# Patient Record
Sex: MALE | Race: WHITE | URBAN - METROPOLITAN AREA
[De-identification: names, ages, dates, MRNs, and addresses within clinical notes are randomized per-mention and may not be internally consistent; named-entity substitution may affect disease eponyms.]

---

## 2020-08-03 ENCOUNTER — INITIAL CONSULT (OUTPATIENT)
Dept: PODIATRY | Facility: CLINIC | Age: 25
End: 2020-08-03

## 2020-08-03 VITALS — WEIGHT: 300 LBS | BODY MASS INDEX: 39.76 KG/M2 | HEIGHT: 73 IN | TEMPERATURE: 98.2 F

## 2020-08-03 RX ORDER — IBUPROFEN 800 MG/1
800 TABLET ORAL 2 TIMES DAILY PRN
Qty: 60 TABLET | Refills: 0 | Status: SHIPPED | OUTPATIENT
Start: 2020-08-03 | End: 2022-01-12

## 2020-08-03 ASSESSMENT — ENCOUNTER SYMPTOMS
NAUSEA: 0
CONSTIPATION: 0
BACK PAIN: 1
SHORTNESS OF BREATH: 0
DIARRHEA: 0

## 2020-08-03 NOTE — PROGRESS NOTES
Herber Wynn  (1995)    8/3/20    Subjective     Herber Wynn is 22 y.o. male who complains today of:    Chief Complaint   Patient presents with    Other       HPI: Herber Wynn is seen in consultation at the request of Dr. Ct Mares for evaluation of ingrown toenail. Review of Systems   Constitutional: Negative for fever. HENT: Negative for hearing loss. Respiratory: Negative for shortness of breath. Gastrointestinal: Negative for constipation, diarrhea and nausea. Genitourinary: Negative for difficulty urinating. Musculoskeletal: Positive for back pain. Negative for neck pain. Skin: Negative for rash. Neurological: Negative for headaches. Hematological: Does not bruise/bleed easily. Psychiatric/Behavioral: Positive for sleep disturbance. He has not tried physical therapy. Date of last of last PT treatment: none    The patient is NOT a diabetic. Allergies:  Patient has no known allergies. No current outpatient medications on file prior to visit. No current facility-administered medications on file prior to visit. History reviewed. No pertinent past medical history. History reviewed. No pertinent surgical history. Social History     Socioeconomic History    Marital status: Single     Spouse name: Not on file    Number of children: Not on file    Years of education: Not on file    Highest education level: Not on file   Occupational History    Not on file   Social Needs    Financial resource strain: Not on file    Food insecurity     Worry: Not on file     Inability: Not on file    Transportation needs     Medical: Not on file     Non-medical: Not on file   Tobacco Use    Smoking status: Former Smoker     Last attempt to quit: 2019     Years since quittin.3    Smokeless tobacco: Never Used   Substance and Sexual Activity    Alcohol use:  Yes     Alcohol/week: 6.0 standard drinks     Types: 6 Cans of beer per week    Drug use: Not Currently    Sexual activity: Not on file   Lifestyle    Physical activity     Days per week: Not on file     Minutes per session: Not on file    Stress: Not on file   Relationships    Social connections     Talks on phone: Not on file     Gets together: Not on file     Attends Jehovah's witness service: Not on file     Active member of club or organization: Not on file     Attends meetings of clubs or organizations: Not on file     Relationship status: Not on file    Intimate partner violence     Fear of current or ex partner: Not on file     Emotionally abused: Not on file     Physically abused: Not on file     Forced sexual activity: Not on file   Other Topics Concern    Not on file   Social History Narrative    Not on file     Family History   Problem Relation Age of Onset    Cancer Father     Hypertension Father            Objective:   Vitals:  Temp 98.2 °F (36.8 °C) (Temporal)   Ht 6' 1\" (1.854 m)   Wt 300 lb (136.1 kg)   BMI 39.58 kg/m² Pain Score:   6    Physical Exam  Constitutional:     Well nourished and well developed, obese. Appears neat and clean. Patient is alert, oriented x3, and in no apparent distress. Vascular:   Dorsalis pedis pulse is palpable bilateral foot. Posterior tibial pulse is palpable bilateral foot. There is focal edema noted to the bilateral big toes. Capillary refill is immediate bilateral hallux. There are no varicosities noted bilaterally. There are no telangiectasia noted bilaterally. Skin temperature gradient is noted to be warm to warm bilaterally. There are no signs of ischemia or skin atrophy noted bilaterally. Hair growth is present bilaterally. Neurological:   Light touch sensation is intact bilaterally. Vibratory sensation is intact bilaterally. Hot versus cold discrimination is intact bilaterally. Sharp versus dull discrimination is intact bilaterally. Patellar deep tendon reflex is graded at 2/4 bilaterally.   Achilles tendon deep tendon reflex is graded at 2/4 bilaterally. The Babinski response is negative bilaterally. Dermatological:  The bilateral nail border of the right hallux and the lateral nail border of the left hallux are erythematous with mild granuloma formation, tirso purulence expressed with compression of the toe, +TTP. The remaining toenails are incurvated and are well groomed bilaterally. Normal skin texture noted bilaterally. Focal hyperkeratotic lesions noted: none. Normal skin turgor noted bilaterally. Webspace 1-4 is dry and intact bilateral foot. Musculoskeletal:  Rectus foot type noted bilaterally. Manual muscle strength is graded at 5/5 for all groups tested bilateral foot. Gross static deformities noted: mild HAV and tailors bunion deformity noted bilaterally. Pain or crepitus noted with active or passive range of motion of the joints of the foot or ankle: none. Normal ankle joint dorsiflexion noted bilateral lower extremity. Psychiatric:    Judgement and insight intact. Short and long term memory intact. No evidence of depression, anxiety, or agitation. Patient is calm, cooperative, and communicative. Appropriate interactions and affect. Assessment:      Diagnosis Orders   1. Pain in toes of both feet  ME DRAIN SKIN ABSCESS COMPLIC   2. Abscess of great toenail of right foot  ME DRAIN SKIN ABSCESS COMPLIC   3. Abscess of great toenail, left  ME DRAIN SKIN ABSCESS COMPLIC   4. Ingrowing nail         Plan:     Bilateral hallux ingrown nail, abscess:    I have had a lengthy discussion with the patient and discussed the various treatment options available. I have recommended an incision and drainage procedure. After obtaining appropriate informed consent and verifying the correct digits, the toes were anesthetized with 4 mL of 1.0% Lidocaine plain each after which the digit was prepped and draped in the usual aseptic manner.      The offending nail borders were removed and the exposed nail bed and nail groove were irrigated with alcohol. An antibiotic impregnated compression dressing was applied to the toes. Written postoperative instructions were dispensed. Orders Placed This Encounter   Medications    ibuprofen (ADVIL;MOTRIN) 800 MG tablet     Sig: Take 1 tablet by mouth 2 times daily as needed for Pain     Dispense:  60 tablet     Refill:  0       Orders Placed This Encounter   Procedures    GA DRAIN SKIN ABSCESS [facility]         Follow up:  Return in about 2 weeks (around 8/17/2020). Shanta Nathan DPM      Please note that this report has been partially produced using speech recognition software which may cause errors including grammar, punctuation, and spelling or words and phrases that may seem inappropriate. If there are questions or concerns please feel free to contact me for clarification.

## 2020-08-17 ENCOUNTER — OFFICE VISIT (OUTPATIENT)
Dept: PODIATRY | Facility: CLINIC | Age: 25
End: 2020-08-17

## 2020-08-17 ENCOUNTER — TELEPHONE (OUTPATIENT)
Dept: PODIATRY | Facility: CLINIC | Age: 25
End: 2020-08-17

## 2020-08-17 VITALS — HEIGHT: 73 IN | TEMPERATURE: 97 F | WEIGHT: 307 LBS | BODY MASS INDEX: 40.69 KG/M2

## 2020-08-17 ASSESSMENT — ENCOUNTER SYMPTOMS
CONSTIPATION: 0
NAUSEA: 0
BACK PAIN: 1
SHORTNESS OF BREATH: 0
DIARRHEA: 0

## 2020-08-17 NOTE — TELEPHONE ENCOUNTER
PATIENT LVM STATING HE FORGOT TO ASK DR CUI IF THERE WAS AN INSERT/CUSHION HE COULD PRESCRIBE FOR HIM TO PUT IN HIS SHOES. 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 PATIENT WANTS TO KNOW IF IT WOULD BE POSSIBLE TO SEND THE ORDER TO THE PHARMACY SO HE COULD PICK IT UP ASAP.  CALL BACK NUMBER  [phone]

## 2020-08-17 NOTE — TELEPHONE ENCOUNTER
I recommend Powerstep full length orthotic available on amaz[employer]. com, the inserts at the pharmacy are not covered by insurance.

## 2020-08-17 NOTE — PROGRESS NOTES
Non-medical: Not on file   Tobacco Use    Smoking status: Former Smoker     Last attempt to quit: 2019     Years since quittin.3    Smokeless tobacco: Never Used   Substance and Sexual Activity    Alcohol use: Yes     Alcohol/week: 6.0 standard drinks     Types: 6 Cans of beer per week    Drug use: Not Currently    Sexual activity: Not on file   Lifestyle    Physical activity     Days per week: Not on file     Minutes per session: Not on file    Stress: Not on file   Relationships    Social connections     Talks on phone: Not on file     Gets together: Not on file     Attends Anabaptist service: Not on file     Active member of club or organization: Not on file     Attends meetings of clubs or organizations: Not on file     Relationship status: Not on file    Intimate partner violence     Fear of current or ex partner: Not on file     Emotionally abused: Not on file     Physically abused: Not on file     Forced sexual activity: Not on file   Other Topics Concern    Not on file   Social History Narrative    Not on file     Family History   Problem Relation Age of Onset    Cancer Father     Hypertension Father            Objective:   Vitals:  Temp 97 °F (36.1 °C)   Ht 6' 1\" (1.854 m)   Wt (!) 307 lb (139.3 kg)   BMI 40.50 kg/m² Pain Score:   0 - No pain    Physical Exam  Vascular:   Dorsalis pedis pulse is palpable bilateral foot. Posterior tibial pulse is palpable bilateral foot. There is no focal edema noted to the bilateral hallux. Capillary refill is immediate bilateral hallux. There are no varicosities noted bilaterally. There are no telangiectasia noted bilaterally. Skin temperature gradient is noted to be warm to warm bilaterally, no increased warmth noted to the bilateral hallux. There are no signs of ischemia or skin atrophy noted bilaterally. Hair growth is present bilaterally.     Neurological:   Light touch sensation is intact bilaterally.   Vibratory sensation is intact bilaterally. Hot versus cold discrimination is intact bilaterally. Sharp versus dull discrimination is intact bilaterally. Patellar deep tendon reflex is graded at 2/4 bilaterally. Achilles tendon deep tendon reflex is graded at 2/4 bilaterally. The Babinski response is negative bilaterally.     Dermatological:  The bilateral nail border of the right hallux and the lateral nail border of the left hallux are dry and intact, there is no erythema, edema,   or tirso purulence expressed with compression of the toe, no tenderness to palpation noted. Normal skin texture noted bilaterally. Focal hyperkeratotic lesions noted: none. Normal skin turgor noted bilaterally. Webspace 1-4 is dry and intact bilateral foot.     Musculoskeletal:  Rectus foot type noted bilaterally. Manual muscle strength is graded at 5/5 for all groups tested bilateral foot. Gross static deformities noted: mild HAV and tailors bunion deformity noted bilaterally. Pain or crepitus noted with active or passive range of motion of the joints of the foot or ankle: none. Normal ankle joint dorsiflexion noted bilateral lower extremity. Assessment:      Diagnosis Orders   1. Ingrowing nail         Plan:     The patient is instructed to continue to monitor for recurrence of the infection, he is to call immediately if this occurs. We discussed options for permanent nail avulsion if the infection/ ingrown nails becomes a chronic problem. I explained to the patient that I believe that pressure from the second toe applied to the lateral corner of the hallux nail is exacerbating the problem, I have recommended that he use a \"toe marissa\" toe separator to alleviate this pressure. Follow up:  Return in about 4 weeks (around 9/14/2020).     Rossi Ureña DPM      Please note that this report has been partially produced using speech recognition software which may cause errors including grammar, punctuation, and spelling or words and phrases that may seem inappropriate. If there are questions or concerns please feel free to contact me for clarification.

## 2020-08-20 ENCOUNTER — TELEPHONE (OUTPATIENT)
Dept: PODIATRY | Facility: CLINIC | Age: 25
End: 2020-08-20

## 2020-08-20 NOTE — TELEPHONE ENCOUNTER
PATIENT LVM STATING HE THINKS HE HAS PLANTAR FASCIITIS. PATIENT STATED HE WALKS A LOT AT WORK AND IS IN SO MUCH PAIN BY THE END OF HIS SHIFT. PATIENT WANTS TO KNOW IF THERE IS SOMETHING DR CUI CAN SUGGEST OR IF HE COULD SQUEEZE HIM IN FOR AN URGENT APPOINTMENT TO BE EVALUATED AGAIN.  CALL BACK NUMBER [phone]

## 2020-08-27 ENCOUNTER — OFFICE VISIT (OUTPATIENT)
Dept: PODIATRY | Facility: CLINIC | Age: 25
End: 2020-08-27

## 2020-08-27 VITALS — BODY MASS INDEX: 39.76 KG/M2 | TEMPERATURE: 97.1 F | HEIGHT: 73 IN | WEIGHT: 300 LBS

## 2020-08-27 RX ORDER — METHYLPREDNISOLONE 4 MG/1
TABLET ORAL
Qty: 1 KIT | Refills: 0 | Status: SHIPPED | OUTPATIENT
Start: 2020-08-27 | End: 2020-09-02

## 2020-08-27 ASSESSMENT — ENCOUNTER SYMPTOMS
SHORTNESS OF BREATH: 0
CONSTIPATION: 0
BACK PAIN: 1
DIARRHEA: 0
NAUSEA: 0

## 2020-08-27 NOTE — PATIENT INSTRUCTIONS
Patient Education        Plantar Fasciitis: Exercises  Introduction  Here are some examples of exercises for you to try. The exercises may be suggested for a condition or for rehabilitation. Start each exercise slowly. Ease off the exercises if you start to have pain. You will be told when to start these exercises and which ones will work best for you. How to do the exercises  Towel stretch   1. Sit with your legs extended and knees straight. 2. Place a towel around your foot just under the toes. 3. Hold each end of the towel in each hand, with your hands above your knees. 4. Pull back with the towel so that your foot stretches toward you. 5. Hold the position for at least 15 to 30 seconds. 6. Repeat 2 to 4 times a session, up to 5 sessions a day. Calf stretch   This exercise stretches the muscles at the back of the lower leg (the calf) and the Achilles tendon. Do this exercise 3 or 4 times a day, 5 days a week. 1. Stand facing a wall with your hands on the wall at about eye level. Put the leg you want to stretch about a step behind your other leg. 2. Keeping your back heel on the floor, bend your front knee until you feel a stretch in the back leg. 3. Hold the stretch for 15 to 30 seconds. Repeat 2 to 4 times. Plantar fascia and calf stretch   Stretching the plantar fascia and calf muscles can increase flexibility and decrease heel pain. You can do this exercise several times each day and before and after activity. 1. Stand on a step as shown above. Be sure to hold on to the banister. 2. Slowly let your heels down over the edge of the step as you relax your calf muscles. You should feel a gentle stretch across the bottom of your foot and up the back of your leg to your knee. 3. Hold the stretch about 15 to 30 seconds, and then tighten your calf muscle a little to bring your heel back up to the level of the step. Repeat 2 to 4 times.     Towel curls   Make this exercise more challenging by placing a weighted object, such as a soup can, on the other end of the towel. 1. While sitting, place your foot on a towel on the floor and scrunch the towel toward you with your toes. 2. Then, also using your toes, push the towel away from you. [place] pickups   1. Put marbles on the floor next to a cup.  2. Using your toes, try to lift the marbles up from the floor and put them in the cup. Follow-up care is a key part of your treatment and safety. Be sure to make and go to all appointments, and call your doctor if you are having problems. It's also a good idea to know your test results and keep a list of the medicines you take. Where can you learn more? Go to [email or web address]://[email or web address].[email or web address]. org and sign in to your [employer] account. Enter P647 in the [employer] box to learn more about \"Plantar Fasciitis: Exercises. \"     If you do not have an account, please click on the \"Sign Up Now\" link. Current as of: March 2, 2020               Content Version: 12.5  © 9341-0579 [employer], [employer]. Care instructions adapted under license by [facility] ([facility]). If you have questions about a medical condition or this instruction, always ask your healthcare professional. [address] any warranty or liability for your use of this information.

## 2020-08-27 NOTE — PROGRESS NOTES
Von Damon  (1995)    8/27/20    Subjective     Von Damon is 22 y.o. male who complains today of:    Chief Complaint   Patient presents with    Foot Pain       HPI: The patient presents with a complaint of bilateral heel pain. He reports sharp pain to the arches when getting out of bed in the morning. He than has sharp and dull pain while at work and after his shift. The pain began 3 weeks ago, when he started his new job. He has had minimal relief by trying different types of shoes and insoles (gel). He denies previous episodes of similar pain but complains of chronic hamstring tightness. Review of Systems   Constitutional: Negative for fever. HENT: Negative for hearing loss. Respiratory: Negative for shortness of breath. Gastrointestinal: Negative for constipation, diarrhea and nausea. Genitourinary: Negative for difficulty urinating. Musculoskeletal: Positive for back pain. Negative for neck pain. Skin: Negative for rash. Neurological: Negative for headaches. Hematological: Does not bruise/bleed easily. Psychiatric/Behavioral: Negative for sleep disturbance. He has not tried physical therapy. Date of last of last PT treatment: none    The patient is NOT a diabetic. Allergies:  Patient has no known allergies. Current Outpatient Medications on File Prior to Visit   Medication Sig Dispense Refill    ibuprofen (ADVIL;MOTRIN) 800 MG tablet Take 1 tablet by mouth 2 times daily as needed for Pain 60 tablet 0     No current facility-administered medications on file prior to visit. History reviewed. No pertinent past medical history. History reviewed. No pertinent surgical history.   Social History     Socioeconomic History    Marital status: Single     Spouse name: Not on file    Number of children: Not on file    Years of education: Not on file    Highest education level: Not on file   Occupational History    Not on file   Social Needs  Financial resource strain: Not on file    Food insecurity     Worry: Not on file     Inability: Not on file   [employer] needs     Medical: Not on file     Non-medical: Not on file   Tobacco Use    Smoking status: Former Smoker     Last attempt to quit: 2019     Years since quittin.4    Smokeless tobacco: Never Used   Substance and Sexual Activity    Alcohol use: Yes     Alcohol/week: 6.0 standard drinks     Types: 6 Cans of beer per week    Drug use: Not Currently    Sexual activity: Not on file   Lifestyle    Physical activity     Days per week: Not on file     Minutes per session: Not on file    Stress: Not on file   Relationships    Social connections     Talks on phone: Not on file     Gets together: Not on file     Attends Rastafari service: Not on file     Active member of club or organization: Not on file     Attends meetings of clubs or organizations: Not on file     Relationship status: Not on file    Intimate partner violence     Fear of current or ex partner: Not on file     Emotionally abused: Not on file     Physically abused: Not on file     Forced sexual activity: Not on file   Other Topics Concern    Not on file   Social History Narrative    Not on file     Family History   Problem Relation Age of Onset    Cancer Father     Hypertension Father            Objective:   Vitals:  Temp 97.1 °F (36.2 °C) (Temporal)   Ht 6' 1\" (1.854 m)   Wt 300 lb (136.1 kg)   BMI 39.58 kg/m² Pain Score:   2    Physical Exam  Vascular:   Dorsalis pedis pulse is palpable bilateral foot. Posterior tibial pulse is palpable bilateral foot. There is no edema noted. Capillary refill is immediate bilateral hallux. There are no varicosities noted bilaterally. There are no telangiectasia noted bilaterally. Skin temperature gradient is noted to be warm to warm bilaterally. There are no signs of ischemia or skin atrophy noted bilaterally.   Hair growth

## 2020-09-04 ENCOUNTER — OFFICE VISIT (OUTPATIENT)
Dept: PODIATRY | Facility: CLINIC | Age: 25
End: 2020-09-04

## 2020-09-04 VITALS — WEIGHT: 307 LBS | TEMPERATURE: 96.6 F | HEIGHT: 73 IN | BODY MASS INDEX: 40.69 KG/M2

## 2020-09-04 ASSESSMENT — ENCOUNTER SYMPTOMS
DIARRHEA: 0
CONSTIPATION: 0
BACK PAIN: 1
SHORTNESS OF BREATH: 0
NAUSEA: 0

## 2020-09-04 NOTE — PROGRESS NOTES
Leonila Anand  (1995)    8/27/20    Subjective     Leonila Anand is 22 y.o. male who complains today of:    Chief Complaint   Patient presents with    Follow-up       HPI  Patient presents for follow-up for bilateral heel pain. Patient reports mild relief with the Medrol Dosepak the first few days, there was minimal relief thereafter. Patient reports continued achy pain at rest and sharp pain with the first few steps after periods of inactivity. Patient reports compliance with icing and stretching, he states that stretching seems to temporarily relieve his symptoms. Patient wishes to proceed with casting for custom orthotics. Review of Systems   Constitutional: Negative for fever. HENT: Negative for hearing loss. Respiratory: Negative for shortness of breath. Gastrointestinal: Negative for constipation, diarrhea and nausea. Genitourinary: Negative for difficulty urinating. Musculoskeletal: Positive for back pain. Negative for neck pain. Skin: Negative for rash. Neurological: Negative for headaches. Hematological: Does not bruise/bleed easily. Psychiatric/Behavioral: Negative for sleep disturbance. He has not tried physical therapy. Date of last of last PT treatment: none    The patient is NOT a diabetic. Allergies:  Patient has no known allergies. Current Outpatient Medications on File Prior to Visit   Medication Sig Dispense Refill    ibuprofen (ADVIL;MOTRIN) 800 MG tablet Take 1 tablet by mouth 2 times daily as needed for Pain 60 tablet 0     No current facility-administered medications on file prior to visit. History reviewed. No pertinent past medical history. History reviewed. No pertinent surgical history.   Social History     Socioeconomic History    Marital status: Single     Spouse name: Not on file    Number of children: Not on file    Years of education: Not on file    Highest education level: Not on file   Occupational History    Not on file   Social Needs    Financial resource strain: Not on file    Food insecurity     Worry: Not on file     Inability: Not on file    Transportation needs     Medical: Not on file     Non-medical: Not on file   Tobacco Use    Smoking status: Former Smoker     Last attempt to quit: 2019     Years since quittin.4    Smokeless tobacco: Never Used   Substance and Sexual Activity    Alcohol use: Yes     Alcohol/week: 6.0 standard drinks     Types: 6 Cans of beer per week    Drug use: Not Currently    Sexual activity: Not on file   Lifestyle    Physical activity     Days per week: Not on file     Minutes per session: Not on file    Stress: Not on file   Relationships    Social connections     Talks on phone: Not on file     Gets together: Not on file     Attends Yazidism service: Not on file     Active member of club or organization: Not on file     Attends meetings of clubs or organizations: Not on file     Relationship status: Not on file    Intimate partner violence     Fear of current or ex partner: Not on file     Emotionally abused: Not on file     Physically abused: Not on file     Forced sexual activity: Not on file   Other Topics Concern    Not on file   Social History Narrative    Not on file     Family History   Problem Relation Age of Onset    Cancer Father     Hypertension Father            Objective:   Vitals:  Temp 96.6 °F (35.9 °C)   Ht 6' 1\" (1.854 m)   Wt (!) 307 lb (139.3 kg)   BMI 40.50 kg/m² Pain Score:   5    Physical Exam  Vascular:   Dorsalis pedis pulse is palpable bilateral foot. Posterior tibial pulse is palpable bilateral foot. There is no edema noted. Capillary refill is immediate bilateral hallux. There are no varicosities noted bilaterally. There are no telangiectasia noted bilaterally. Skin temperature gradient is noted to be warm to warm bilaterally. There are no signs of ischemia or skin atrophy noted bilaterally.   Hair growth is present bilaterally.     Neurological:   Light touch sensation is intact bilaterally. Vibratory sensation is intact bilaterally. Hot versus cold discrimination is intact bilaterally. Sharp versus dull discrimination is intact bilaterally. Patellar deep tendon reflex is graded at 2/4 bilaterally. Achilles tendon deep tendon reflex is graded at 2/4 bilaterally. The Babinski response is negative bilaterally.     Dermatological:  The bilateral nail border of the right hallux and the lateral nail border of the left hallux are dry and intact, there is no sign of infection or regrowth of the nail plate. Normal skin texture noted bilaterally. Focal hyperkeratotic lesions noted: none. Normal skin turgor noted bilaterally. Webspace 1-4 is dry and intact bilateral foot.     Musculoskeletal:  Rectus foot type noted bilaterally. Manual muscle strength is graded at 5/5 for all groups tested bilateral foot. Mild halluxabductovalgus and tailors bunion deformity noted bilaterally. No pain or crepitus noted with range of motion of the joints of the foot or ankle.   Decreased ankle joint dorsiflexion noted bilateral lower extremity. There is tenderness to palpation of the right lateral calcaneal tubercle and the left medial calcaneal tubercle. There is no pain with medial to lateral compression of the heel bilaterally. Assessment:      Diagnosis Orders   1. Pain in both feet     2. Bilateral plantar fasciitis         Plan:     Plantar fasciitis:   The patient is evaluated and found to have a biomechanical dysfunction causing  significant pain and deformity. It is recommended that use of a custom orthotic be undertaken to treat this  deformity and malalignment while reducing pain. After obtaining appropriate range of motion measurements of the hindfoot to forefoot relationship, negative plaster casting impressions of the right and left feet were made using a neutral suspension technique.     Plaster casting material was used for the right and left foot to make the  impressions. These functional foot orthotics will be packaged, handled, and mailed to an outside  laboratory and fashioned as removable devices. The patient is to continue previously enacted conservative measures. The patient will be contacted when the orthotics have arrived. Follow up:  Return for orthotic dispensing. Arthur Ratliff DPM      Please note that this report has been partially produced using speech recognition software which may cause errors including grammar, punctuation, and spelling or words and phrases that may seem inappropriate. If there are questions or concerns please feel free to contact me for clarification.

## 2020-10-20 ENCOUNTER — TELEPHONE (OUTPATIENT)
Dept: PODIATRY | Facility: CLINIC | Age: 25
End: 2020-10-20

## 2020-10-20 NOTE — TELEPHONE ENCOUNTER
Patient notified that it can take 4-6 weeks to come in and that as soon as they come in he will be called and an appointment scheduled. He understood.

## 2020-10-20 NOTE — TELEPHONE ENCOUNTER
Patient lvm stating he came in for an appt for foot molds over a month ago and wondering when he will be getting a call about them.  [phone]

## 2021-01-12 ENCOUNTER — TELEPHONE (OUTPATIENT)
Dept: PODIATRY | Facility: CLINIC | Age: 26
End: 2021-01-12

## 2021-01-12 NOTE — TELEPHONE ENCOUNTER
Patient called stating his ingrown toenails are coming back. Patient states he would like to have them permanently removed instead of temporary this time. Patient made appt on 1/29 but asking if that can be done then or any Friday.  Call back [phone]

## 2021-01-29 ENCOUNTER — OFFICE VISIT (OUTPATIENT)
Dept: PODIATRY | Facility: CLINIC | Age: 26
End: 2021-01-29

## 2021-01-29 VITALS — BODY MASS INDEX: 41.08 KG/M2 | TEMPERATURE: 96.9 F | HEIGHT: 73 IN | WEIGHT: 310 LBS

## 2021-01-29 DIAGNOSIS — M72.2 BILATERAL PLANTAR FASCIITIS: ICD-10-CM

## 2021-01-29 DIAGNOSIS — M79.675 PAIN IN TOE OF LEFT FOOT: Primary | ICD-10-CM

## 2021-01-29 DIAGNOSIS — M79.671 PAIN IN BOTH FEET: ICD-10-CM

## 2021-01-29 DIAGNOSIS — M79.672 PAIN IN BOTH FEET: ICD-10-CM

## 2021-01-29 DIAGNOSIS — L02.612 ABSCESS OF LEFT GREAT TOE: ICD-10-CM

## 2021-01-29 DIAGNOSIS — L60.0 INGROWING NAIL: ICD-10-CM

## 2021-01-29 RX ORDER — IBUPROFEN 800 MG/1
800 TABLET ORAL EVERY 12 HOURS
Qty: 14 TABLET | Refills: 1 | Status: SHIPPED | OUTPATIENT
Start: 2021-01-29 | End: 2022-01-12

## 2021-01-29 ASSESSMENT — ENCOUNTER SYMPTOMS
CONSTIPATION: 0
NAUSEA: 0
SHORTNESS OF BREATH: 0
BACK PAIN: 1
DIARRHEA: 0

## 2021-01-29 NOTE — PROGRESS NOTES
Madie Winn  (1995)  01/29/2021    Subjective     Madie Winn is 22 y.o. male who complains today of:    Chief Complaint   Patient presents with    Nail Problem     Left Big Toe       HPI  Patient presents for follow-up, he denies pain in the chair today. Patient reports mild heel pain with the first few steps after inactivity. He presents for dispensing of custom orthotics. Patient reports recurrence of painful ingrown nail to the left big toe, there is pain with direct pressure to the toe. It is red and swollen, he denies observing pus. He would like to have the side of the nail permanently removed. Review of Systems   Constitutional: Negative for fever. HENT: Negative for hearing loss. Respiratory: Negative for shortness of breath. Gastrointestinal: Negative for constipation, diarrhea and nausea. Genitourinary: Negative for difficulty urinating. Musculoskeletal: Positive for back pain. Negative for neck pain. Skin: Negative for rash. Neurological: Negative for headaches. Hematological: Does not bruise/bleed easily. Psychiatric/Behavioral: Negative for sleep disturbance. He has not tried physical therapy. Date of last of last PT treatment: none    The patient is NOT a diabetic. Allergies:  Patient has no known allergies. Current Outpatient Medications on File Prior to Visit   Medication Sig Dispense Refill    ibuprofen (ADVIL;MOTRIN) 800 MG tablet Take 1 tablet by mouth 2 times daily as needed for Pain 60 tablet 0     No current facility-administered medications on file prior to visit. History reviewed. No pertinent past medical history. History reviewed. No pertinent surgical history.   Social History     Socioeconomic History    Marital status: Single     Spouse name: Not on file    Number of children: Not on file    Years of education: Not on file    Highest education level: Not on file   Occupational History    Not on file Social Needs    Financial resource strain: Not on file    Food insecurity     Worry: Not on file     Inability: Not on file    Transportation needs     Medical: Not on file     Non-medical: Not on file   Tobacco Use    Smoking status: Former Smoker     Quit date: 2019     Years since quittin.8    Smokeless tobacco: Never Used   Substance and Sexual Activity    Alcohol use: Yes     Alcohol/week: 6.0 standard drinks     Types: 6 Cans of beer per week    Drug use: Not Currently    Sexual activity: Not on file   Lifestyle    Physical activity     Days per week: Not on file     Minutes per session: Not on file    Stress: Not on file   Relationships    Social connections     Talks on phone: Not on file     Gets together: Not on file     Attends Buddhist service: Not on file     Active member of club or organization: Not on file     Attends meetings of clubs or organizations: Not on file     Relationship status: Not on file    Intimate partner violence     Fear of current or ex partner: Not on file     Emotionally abused: Not on file     Physically abused: Not on file     Forced sexual activity: Not on file   Other Topics Concern    Not on file   Social History Narrative    Not on file     Family History   Problem Relation Age of Onset    Cancer Father     Hypertension Father            Objective:   Vitals:  Temp 96.9 °F (36.1 °C)   Ht 6' 1\" (1.854 m)   Wt (!) 310 lb (140.6 kg)   BMI 40.90 kg/m² Pain Score:   0 - No pain    Physical Exam  Vascular:   Dorsalis pedis pulse is palpable bilateral foot. Posterior tibial pulse is palpable bilateral foot. There is focal edema noted to the lateral aspect of the left hallux. Capillary refill is immediate bilateral hallux. There are no varicosities noted bilaterally. There are no telangiectasia noted bilaterally. Skin temperature gradient is noted to be warm to warm bilaterally. There are no signs of ischemia or skin atrophy noted bilaterally. Hair growth is present bilaterally.     Neurological:   Light touch sensation is intact bilaterally. Vibratory sensation is intact bilaterally. Hot versus cold discrimination is intact bilaterally. Sharp versus dull discrimination is intact bilaterally. Patellar deep tendon reflex is graded at 2/4 bilaterally. Achilles tendon deep tendon reflex is graded at 2/4 bilaterally. The Babinski response is negative bilaterally.     Dermatological:  There is erythema, calor, and tirso purulence expressed with compression of the  lateral nail border of the left hallux. Normal skin texture noted bilaterally. Focal hyperkeratotic lesions noted: none. Normal skin turgor noted bilaterally. Webspace 1-4 is dry and intact bilateral foot.     Musculoskeletal:  Rectus foot type noted bilaterally. Manual muscle strength is graded at 5/5 for all groups tested bilateral foot. Mild halluxabductovalgus and tailors bunion deformity noted bilaterally. No pain or crepitus noted with range of motion of the joints of the foot or ankle.   Decreased ankle joint dorsiflexion noted bilateral lower extremity. There is tenderness to palpation of the right lateral calcaneal tubercle and the left medial calcaneal tubercle. There is no pain with medial to lateral compression of the heel bilaterally. Assessment:      Diagnosis Orders   1. Pain in toe of left foot  IN DRAIN SKIN ABSCESS COMPLIC    IN REMOVAL OF NAIL BED   2. Abscess of left great toe  IN DRAIN SKIN ABSCESS COMPLIC   3. Ingrowing nail  IN REMOVAL OF NAIL BED   4. Pain in both feet     5. Bilateral plantar fasciitis         Plan:     Plantar fasciitis:   The orthotics were dispensed and fit to the patient's existing shoe gear. Explicit break-in instructions were dicussed/ dispensed, break-in should take approximately four weeks. The patient was counseled today regarding the buying and fitting of appropriate footwear. The patient will continue with all other concomitant/ previous  conservative care rendered. The exam, fitting, gait analysis, break in protocol discussion, and shoe gear discussion required more than 15 minutes to complete. Return to clinic in four weeks, or sooner, should concerns arise. Left hallux ingrown nail, abscess:    I have had a lengthy discussion with the patient and discussed the various treatment options available. I explained to the patient that it is not advisable to perform a permanent nail when there is infection present. I have recommended an incision and drainage procedure. After obtaining appropriate informed consent and verifying the correct digit, the toe was anesthetized with 3 mL of a 1:1 mix of 1.0% Lidocaine plain and 0.5% Bupivacaine plain after which the digit was prepped and draped in the usual aseptic manner. The offending nail border was removed and the exposed nail bed and nail groove were irrigated with alcohol. An antibiotic impregnated compression dressing was applied to the toe. Written postoperative instructions were dispensed. We will perform a permanent partial nail avulsion to the lateral left hallux at his follow-up visit if there are no signs of infection. Follow up:  Return in about 4 weeks (around 2/26/2021) for permanent nail procedure. Meagan Hedrick DPM      Total time spent in patient care: 30 minutes. Level of medical decision making: low. Please note that this report has been partially produced using speech recognition software which may cause errors including grammar, punctuation, and spelling or words and phrases that may seem inappropriate. If there are questions or concerns please feel free to contact me for clarification.

## 2021-01-29 NOTE — PATIENT INSTRUCTIONS
POST-OP INSTRUCTIONS FOR NAIL PROCEDURES    Elevate your foot above your heart for 2-3 hours after surgery to help reduce bleeding. It is not unusual for this type of surgery to bleed, so if your dressing spots with blood do not get alarmed. You may reinforce the dressing with gauze if you wish. Keep your dressing clean, dry, and intact for 24 hours after the procedure. After a day, remove the bandage and gently cleanse the area with warm, soapy water. Apply a small amount of bacitracin, Neosporin or other antibacterial ointment to the wound and cover with a band aid. Continue wound care until the wound is no longer draining. Pain is usually controlled with a mild analgesic such as extra strength Tylenol 1-2 tablets every 6-8 hours, or Ibuprofen 200mg 1-2 tablets every 6-8 hours with a meal.  You may also try applying an ice pack directly to the toe for 10 minutes at a time (with at least 30 minutes between icing sessions to protect the skin). If the pain is intolerable please phone the office. Fever, increased redness, increased swelling, pus/ purulent drainage, and pain not controlled by medicine may be signs of infection, please call the office immediately. If you have any other questions, please call the office at [phone]              ORTHOTIC BREAK-IN INSTRUCTIONS    You have just been fitted with your custom, functional orthotic devices. They are fabricated using the latest biomechanical concepts to promote normal foot alignment. Abnormal foot alignment causes abnormal function, leading to the development of numerous foot conditions. There is a right and left orthotic, so be sure they are placed in the proper shoe. The underside is marked with the proper right or left designation. Break in your orthotics gradually. It is normal to have some discomfort or achiness in your feet or legs because your tendons, ligaments and muscles are adjusting to the changes. Begin wearing your orthotics two hours per day for the first week. Increase the wearing time two hours each additional week until you are wearing them full time. It may take four to six weeks for your body to acclimate to these changes. It is recommended that you do not exercise in your orthotics until your body has fully adjusted. Week 1  2 hrs/day  Week 2  4 hrs/day  Week 3  6 hrs/day  Week 4  8 hrs/day    A one month follow-up appointment will be scheduled, as the doctor wishes to monitor your progress with the orthotic treatment. Occasionally your orthotics may need to be adjusted, which can usually be done at your appointment. Common Questions:    (Q) How do I clean my orthotics? (A) Clean your orthotics with soap and cool water, DO NOT use warm or hot water, as this may distort  the orthotic. (Q) My orthotics squeak, what can I do? (A) Sprinkle a little talcum powder in your shoes and rub the side of a candle or mikey wax around the edge of the orthotic. For additional advice or concerns, please contact our office, [phone].

## 2022-01-03 ENCOUNTER — TELEPHONE (OUTPATIENT)
Dept: PODIATRY | Age: 27
End: 2022-01-03

## 2022-01-03 NOTE — TELEPHONE ENCOUNTER
PATIENT WAS CALLED AND MESSAGE LEFT THAT DR. Pratima Gibbs IS OPENING UP 1/4/22 AND TO SEE IF HER WOULD LIKE TO COME IN EARLIER INSTEAD OF 1/21/22.

## 2022-01-12 ENCOUNTER — OFFICE VISIT (OUTPATIENT)
Dept: PODIATRY | Age: 27
End: 2022-01-12
Payer: COMMERCIAL

## 2022-01-12 VITALS — BODY MASS INDEX: 39.76 KG/M2 | WEIGHT: 300 LBS | TEMPERATURE: 98 F | HEIGHT: 73 IN

## 2022-01-12 DIAGNOSIS — M79.674 PAIN IN TOE OF RIGHT FOOT: ICD-10-CM

## 2022-01-12 DIAGNOSIS — L02.612 ABSCESS OF LEFT GREAT TOE: ICD-10-CM

## 2022-01-12 DIAGNOSIS — M79.675 PAIN OF TOE OF LEFT FOOT: Primary | ICD-10-CM

## 2022-01-12 DIAGNOSIS — L60.0 INGROWING NAIL: ICD-10-CM

## 2022-01-12 PROCEDURE — 1036F TOBACCO NON-USER: CPT | Performed by: PODIATRIST

## 2022-01-12 PROCEDURE — G8484 FLU IMMUNIZE NO ADMIN: HCPCS | Performed by: PODIATRIST

## 2022-01-12 PROCEDURE — G8417 CALC BMI ABV UP PARAM F/U: HCPCS | Performed by: PODIATRIST

## 2022-01-12 PROCEDURE — 10060 I&D ABSCESS SIMPLE/SINGLE: CPT | Performed by: PODIATRIST

## 2022-01-12 PROCEDURE — G8427 DOCREV CUR MEDS BY ELIG CLIN: HCPCS | Performed by: PODIATRIST

## 2022-01-12 PROCEDURE — 99213 OFFICE O/P EST LOW 20 MIN: CPT | Performed by: PODIATRIST

## 2022-01-12 PROCEDURE — 11730 AVULSION NAIL PLATE SIMPLE 1: CPT | Performed by: PODIATRIST

## 2022-01-12 RX ORDER — BUPIVACAINE HYDROCHLORIDE 5 MG/ML
3 INJECTION, SOLUTION PERINEURAL ONCE
Status: COMPLETED | OUTPATIENT
Start: 2022-01-12 | End: 2022-01-12

## 2022-01-12 RX ORDER — LIDOCAINE HYDROCHLORIDE 10 MG/ML
3 INJECTION, SOLUTION INFILTRATION; PERINEURAL ONCE
Status: COMPLETED | OUTPATIENT
Start: 2022-01-12 | End: 2022-01-12

## 2022-01-12 RX ORDER — IBUPROFEN 800 MG/1
800 TABLET ORAL 2 TIMES DAILY PRN
Qty: 14 TABLET | Refills: 0 | Status: SHIPPED | OUTPATIENT
Start: 2022-01-12 | End: 2022-06-13 | Stop reason: SDUPTHER

## 2022-01-12 RX ADMIN — BUPIVACAINE HYDROCHLORIDE 15 MG: 5 INJECTION, SOLUTION PERINEURAL at 10:20

## 2022-01-12 RX ADMIN — LIDOCAINE HYDROCHLORIDE 3 ML: 10 INJECTION, SOLUTION INFILTRATION; PERINEURAL at 10:21

## 2022-01-12 ASSESSMENT — ENCOUNTER SYMPTOMS
SHORTNESS OF BREATH: 0
NAUSEA: 0
BACK PAIN: 1
VOMITING: 0

## 2022-01-12 NOTE — PROGRESS NOTES
[facility]  [address] [place] [address]  Dept: [phone]  Loc: [phone]       Steffi Mckinnon  (1995)    22    Subjective     Steffi Mckinnon is 32 y.o. male who complains today of:    Chief Complaint   Patient presents with    Toe Pain     DEBORAH Big toes       HPI: Patient presents with complaint to painful ingrown nails to both big toes. He describes having sharp and shooting pain. He rates the pain at 8/10. The symptoms began 4 months ago and have been stable. Wearing socks and tight shoes exacerbates the pain, not wearing socks and wearing open toed sandals decreases the pain. Review of Systems   Constitutional: Negative for chills and fever. HENT: Negative for hearing loss. Respiratory: Negative for shortness of breath. Cardiovascular: Negative for chest pain. Gastrointestinal: Negative for nausea and vomiting. Genitourinary: Negative for difficulty urinating. Musculoskeletal: Positive for back pain and gait problem. Skin: Negative for wound. Neurological: Negative for numbness. Hematological: Does not bruise/bleed easily. Psychiatric/Behavioral: Negative for sleep disturbance. The patient is not a diabetic. Allergies:  Patient has no known allergies. No current outpatient medications on file prior to visit. No current facility-administered medications on file prior to visit. History reviewed. No pertinent past medical history. History reviewed. No pertinent surgical history.   Social History     Socioeconomic History    Marital status: Single     Spouse name: Not on file    Number of children: Not on file    Years of education: Not on file    Highest education level: Not on file   Occupational History    Not on file   Tobacco Use    Smoking status: Former Smoker     Quit date: 2019     Years since quittin.7    Smokeless tobacco: Never Used   Vaping Use    Vaping Use: Every day    Substances: Always   Substance and Sexual Activity    Alcohol use: Yes     Alcohol/week: 6.0 standard drinks     Types: 6 Cans of beer per week    Drug use: Not Currently    Sexual activity: Not on file   Other Topics Concern    Not on file   Social History Narrative    Not on file     Social Determinants of Health     Financial Resource Strain:     Difficulty of Paying Living Expenses: Not on file   Food Insecurity:     Worried About Running Out of Food in the Last Year: Not on file    Ree of Food in the Last Year: Not on file   Transportation Needs:     Lack of Transportation (Medical): Not on file    Lack of Transportation (Non-Medical):  Not on file   Physical Activity:     Days of Exercise per Week: Not on file    Minutes of Exercise per Session: Not on file   Stress:     Feeling of Stress : Not on file   Social Connections:     Frequency of Communication with Friends and Family: Not on file    Frequency of Social Gatherings with Friends and Family: Not on file    Attends Jewish Services: Not on file    Active Member of [address] [place] [address] or Organizations: Not on file    Attends Club or Organization Meetings: Not on file    Marital Status: Not on file   Intimate Partner Violence:     Fear of Current or Ex-Partner: Not on file    Emotionally Abused: Not on file    Physically Abused: Not on file    Sexually Abused: Not on file   Housing Stability:     Unable to Pay for Housing in the Last Year: Not on file    Number of [place] in the Last Year: Not on file    Unstable Housing in the Last Year: Not on file     Family History   Problem Relation Age of Onset    Cancer Father     Hypertension Father            Objective:   Vitals:  Temp 98 °F (36.7 °C)   Ht 6' 1\" (1.854 m)   Wt 300 lb (136.1 kg)   BMI 39.58 kg/m² Pain Score:   1      Physical Exam  Feet:      Comments:   Vascular:     Dorsalis pedis pulse is palpable bilateral foot. Posterior tibial pulse is palpable bilateral foot. There is focal edema noted to the lateral aspect of the left hallux and the medial aspect of the right hallux. Capillary refill is immediate bilateral hallux. There are no varicosities noted bilaterally. There are no telangiectasia noted bilaterally. Skin temperature gradient is noted to be warm to warm bilaterally. There are no signs of ischemia or skin atrophy noted bilaterally. Hair growth is present bilaterally.     Neurological:     Light touch sensation is intact bilaterally. Vibratory sensation is intact bilaterally. Hot versus cold discrimination is intact bilaterally. Sharp versus dull discrimination is intact bilaterally. Patellar deep tendon reflex is graded at 2/4 bilaterally. Achilles tendon deep tendon reflex is graded at 2/4 bilaterally. The Babinski response is negative bilaterally.     Dermatological:    There is erythema, calor, and tirso purulence expressed with compression of the  lateral nail border of the left hallux. Erythema and edema without calor or tirso purulence/ abscess noted to the right hallux medial nail groove. Normal skin texture noted bilaterally. Focal hyperkeratotic lesions noted: none. Normal skin turgor noted bilaterally. Webspace 1-4 is dry and intact bilateral foot.     Musculoskeletal:    Rectus foot type noted bilaterally. Manual muscle strength is graded at 5/5 for all groups tested bilateral foot. Mild halluxabductovalgus and tailors bunion deformity noted bilaterally. No pain or crepitus noted with range of motion of the joints of the foot or ankle.   Decreased ankle joint dorsiflexion noted bilateral lower extremity. Assessment:      Diagnosis Orders   1. Pain of toe of left foot  OH DRAIN SKIN ABSCESS SIMPLE    OH REMOVAL OF NAIL BED   2. Pain in toe of right foot  OH REMOVAL OF NAIL PLATE    OH REMOVAL OF NAIL BED   3.  Abscess of left great toe  OH DRAIN SKIN ABSCESS SIMPLE   4. Ingrowing nail  [place] REMOVAL OF NAIL PLATE    HI REMOVAL OF NAIL BED       Plan:     Left hallux ingrown nail, abscess:    I have had a lengthy discussion with the patient and discussed the various treatment options available. I have recommended an incision and drainage procedure. After obtaining appropriate informed consent and verifying the correct digit, the toe was anesthetized with 3 mL of a 1:1 mix of 1.0% Lidocaine plain and 0.5% Bupivacaine plain after which the digit was prepped and draped in the usual aseptic manner. The offending lateral nail border was removed and the exposed nail bed and nail groove were irrigated with alcohol. An antibiotic impregnated compression dressing was applied to the toe. Written postoperative instructions were dispensed. Right hallux ingrown nail:    I have had a lengthy discussion with the patient and discussed the various treatment options available. I have recommended a nail avulsion procedure. After obtaining appropriate informed consent and verifying the correct digit, the toe was anesthetized with 3 mL of a 1:1 mix of 1.0% Lidocaine plain and 0.5% Bupivacaine plain after which the digit was prepped and draped in the usual aseptic manner. The offending medial nail border was removed and the exposed nail bed and nail groove were irrigated with alcohol. An antibiotic impregnated compression dressing was applied to the toe. Written postoperative instructions were dispensed. I have prescribed ibuprofen, take as directed. I have recommended partial permanent nail avulsion procedure to the bilateral border of the bilateral hallux, the procedures will be scheduled.     Orders Placed This Encounter   Medications    lidocaine 1 % injection 3 mL    bupivacaine (MARCAINE) 0.5 % injection 15 mg       Orders Placed This Encounter   Procedures    HI DRAIN SKIN ABSCESS SIMPLE    HI REMOVAL OF NAIL PLATE    HI REMOVAL OF NAIL BED     Standing Status:   Future     Standing Expiration Date:   4/12/2022           Follow up:  Return in about 2 weeks (around 1/26/2022). Sohail Arevalo DPM      Level of medical decision making: low. Please note that this report has been partially produced using speech recognition software which may cause errors including grammar, punctuation, and spelling or words and phrases that may seem inappropriate. If there are questions or concerns please feel free to contact me for clarification.

## 2022-01-12 NOTE — PATIENT INSTRUCTIONS
POST-OP INSTRUCTIONS FOR NAIL PROCEDURES    Elevate your foot above your heart for 2-3 hours after surgery to help reduce bleeding. It is not unusual for this type of surgery to bleed, so if your dressing spots with blood do not get alarmed. You may reinforce the dressing with gauze if you wish. Keep your dressing clean, dry, and intact for 24 hours after the procedure. After a day, remove the bandage and gently cleanse the area with warm, soapy water. Apply a small amount of bacitracin, Neosporin or other antibacterial ointment to the wound and cover with a band aid. Continue wound care until the wound is no longer draining. Pain is usually controlled with a mild analgesic such as extra strength Tylenol 1-2 tablets every 6-8 hours, or Ibuprofen 200mg 1-2 tablets every 6-8 hours with a meal.  You may also try applying an ice pack directly to the toe for 10 minutes at a time (with at least 30 minutes between icing sessions to protect the skin). If the pain is intolerable please phone the office. Fever, increased redness, increased swelling, pus/ purulent drainage, and pain not controlled by medicine may be signs of infection, please call the office immediately.      If you have any other questions, please call the office at [phone]

## 2022-06-06 ENCOUNTER — TELEPHONE (OUTPATIENT)
Dept: PAIN MANAGEMENT | Age: 27
End: 2022-06-06

## 2022-06-13 ENCOUNTER — OFFICE VISIT (OUTPATIENT)
Dept: PODIATRY | Age: 27
End: 2022-06-13
Payer: COMMERCIAL

## 2022-06-13 VITALS
BODY MASS INDEX: 41.75 KG/M2 | HEIGHT: 73 IN | DIASTOLIC BLOOD PRESSURE: 82 MMHG | TEMPERATURE: 96.8 F | SYSTOLIC BLOOD PRESSURE: 122 MMHG | WEIGHT: 315 LBS

## 2022-06-13 DIAGNOSIS — L60.0 INGROWING NAIL: ICD-10-CM

## 2022-06-13 DIAGNOSIS — M54.41 CHRONIC BILATERAL LOW BACK PAIN WITH RIGHT-SIDED SCIATICA: ICD-10-CM

## 2022-06-13 DIAGNOSIS — M79.675 PAIN IN TOES OF BOTH FEET: Primary | ICD-10-CM

## 2022-06-13 DIAGNOSIS — G89.29 CHRONIC BILATERAL LOW BACK PAIN WITH RIGHT-SIDED SCIATICA: ICD-10-CM

## 2022-06-13 DIAGNOSIS — M79.674 PAIN IN TOES OF BOTH FEET: Primary | ICD-10-CM

## 2022-06-13 PROBLEM — Z72.0 TOBACCO USE: Status: ACTIVE | Noted: 2019-01-22

## 2022-06-13 PROCEDURE — 11750 EXCISION NAIL&NAIL MATRIX: CPT | Performed by: PODIATRIST

## 2022-06-13 PROCEDURE — 99999 PR OFFICE/OUTPT VISIT,PROCEDURE ONLY: CPT | Performed by: PODIATRIST

## 2022-06-13 RX ORDER — LIDOCAINE HYDROCHLORIDE 10 MG/ML
2.5 INJECTION, SOLUTION INFILTRATION; PERINEURAL ONCE
Status: COMPLETED | OUTPATIENT
Start: 2022-06-13 | End: 2022-06-13

## 2022-06-13 RX ORDER — IBUPROFEN 800 MG/1
800 TABLET ORAL 2 TIMES DAILY PRN
Qty: 14 TABLET | Refills: 0 | Status: SHIPPED | OUTPATIENT
Start: 2022-06-13 | End: 2022-06-20

## 2022-06-13 RX ORDER — BUPIVACAINE HYDROCHLORIDE 5 MG/ML
2.5 INJECTION, SOLUTION PERINEURAL ONCE
Status: COMPLETED | OUTPATIENT
Start: 2022-06-13 | End: 2022-06-13

## 2022-06-13 RX ADMIN — BUPIVACAINE HYDROCHLORIDE 12.5 MG: 5 INJECTION, SOLUTION PERINEURAL at 15:50

## 2022-06-13 RX ADMIN — LIDOCAINE HYDROCHLORIDE 2.5 ML: 10 INJECTION, SOLUTION INFILTRATION; PERINEURAL at 15:51

## 2022-06-13 ASSESSMENT — ENCOUNTER SYMPTOMS
BACK PAIN: 1
SHORTNESS OF BREATH: 0
NAUSEA: 0
VOMITING: 0

## 2022-06-13 NOTE — PATIENT INSTRUCTIONS
POST-OP INSTRUCTIONS FOR NAIL PROCEDURES    Elevate your foot above your heart for 2-3 hours after surgery to help reduce bleeding. It is not unusual for this type of surgery to bleed, so if your dressing spots with blood do not get alarmed. You may reinforce the dressing with gauze if you wish. Keep your dressing clean, dry, and intact for 24 hours after the procedure. After a day, remove the bandage and gently cleanse the area with warm, soapy water. Apply a small amount of bacitracin, Neosporin or other antibacterial ointment to the wound and cover with a band aid. Continue wound care until the wound is no longer draining. Pain is usually controlled with a mild analgesic such as extra strength Tylenol 1-2 tablets every 6-8 hours, or Ibuprofen 200mg 1-2 tablets every 6-8 hours with a meal.  You may also try applying an ice pack directly to the toe for 10 minutes at a time (with at least 30 minutes between icing sessions to protect the skin). If the pain is intolerable please phone the office. Fever, increased redness, increased swelling, pus/ purulent drainage, and pain not controlled by medicine may be signs of infection, please call the office immediately.      If you have any other questions, please call the office at [phone]

## 2022-06-13 NOTE — PROGRESS NOTES
[facility]  [address] [place] [address]  Dept: [phone]  Loc: [phone]       Oziel Cuevas  (1995)    6/13/22    Subjective     Oziel Cuevas is 32 y.o. male who complains today of:    Chief Complaint   Patient presents with    Nail Problem       HPI: Patient presents with a complaint of recurrent ingrown toenails to both big toes, both borders. Patient states he started having pain again recently and would like to proceed with doing partial permanent nail avulsions before he develops infections like he has in the past.  Patient also requesting referral to pain management due to chronic low back pain and right-sided sciatica that has had since 2015. Review of Systems   Constitutional: Negative for chills and fever. HENT: Negative for hearing loss. Respiratory: Negative for shortness of breath. Cardiovascular: Negative for chest pain. Gastrointestinal: Negative for nausea and vomiting. Genitourinary: Negative for difficulty urinating. Musculoskeletal: Positive for back pain and gait problem. Skin: Negative for wound. Neurological: Negative for numbness. Hematological: Does not bruise/bleed easily. Psychiatric/Behavioral: Negative for sleep disturbance. Allergies:  Patient has no known allergies. No current outpatient medications on file prior to visit. No current facility-administered medications on file prior to visit. History reviewed. No pertinent past medical history. History reviewed. No pertinent surgical history.   Social History     Socioeconomic History    Marital status: Single     Spouse name: Not on file    Number of children: Not on file    Years of education: Not on file    Highest education level: Not on file   Occupational History    Not on file   Tobacco Use    Smoking status: Former Smoker     Quit date: 4/1/2019     Years since quitting: 3.2    Smokeless tobacco: Never Used   Vaping Use    Vaping Use: Every day    Substances: Always   Substance and Sexual Activity    Alcohol use: Yes     Alcohol/week: 6.0 standard drinks     Types: 6 Cans of beer per week    Drug use: Not Currently    Sexual activity: Not on file   Other Topics Concern    Not on file   Social History Narrative    Not on file     Social Determinants of Health     Financial Resource Strain:     Difficulty of Paying Living Expenses: Not on file   Food Insecurity:     Worried About Running Out of Food in the Last Year: Not on file    Ree of Food in the Last Year: Not on file   Transportation Needs:     Lack of Transportation (Medical): Not on file    Lack of Transportation (Non-Medical):  Not on file   Physical Activity:     Days of Exercise per Week: Not on file    Minutes of Exercise per Session: Not on file   Stress:     Feeling of Stress : Not on file   Social Connections:     Frequency of Communication with Friends and Family: Not on file    Frequency of Social Gatherings with Friends and Family: Not on file    Attends Yazidism Services: Not on file    Active Member of [address] [place] [address] [employer] or Organizations: Not on file    Attends Club or Organization Meetings: Not on file    Marital Status: Not on file   Intimate Partner Violence:     Fear of Current or Ex-Partner: Not on file    Emotionally Abused: Not on file    Physically Abused: Not on file    Sexually Abused: Not on file   Housing Stability:     Unable to Pay for Housing in the Last Year: Not on file    Number of [place] in the Last Year: Not on file    Unstable Housing in the Last Year: Not on file     Family History   Problem Relation Age of Onset    Cancer Father     Hypertension Father            Objective:   Vitals:  /82 (Site: Right Upper Arm)   Temp 96.8 °F (36 °C) (Temporal)   Ht 6' 1\" (1.854 m)   Wt (!) 320 lb (145.2 kg)   BMI 42.22 kg/m² Physical Exam  Vitals reviewed. Feet:      Comments:   Vascular:   Dorsalis pedis pulse is palpable bilateral foot. Posterior tibial pulse is palpable bilateral foot. There is no focal edema noted to the bilateral hallux. Capillary refill is immediate bilateral hallux. There are no varicosities noted bilaterally. There are no telangiectasia noted bilaterally. Skin temperature gradient is noted to be warm to warm bilaterally, no increased warmth noted to the bilateral hallux. There are no signs of ischemia or skin atrophy noted bilaterally. Hair growth is present bilaterally.     Neurological:  Light touch sensation is intact bilaterally. Vibratory sensation is intact bilaterally. Hot versus cold discrimination is intact bilaterally. Sharp versus dull discrimination is intact bilaterally. Patellar deep tendon reflex is graded at 2/4 bilaterally. Achilles tendon deep tendon reflex is graded at 2/4 bilaterally. The Babinski response is negative bilaterally.     Dermatological:  The bilateral hallux toenail is incurvated at both borders, there is no sign of acute bacterial infection, there is tenderness to palpation of the nail plates. Normal skin texture noted bilaterally. Focal hyperkeratotic lesions noted: none. Normal skin turgor noted bilaterally. Webspace 1-4 is dry and intact bilateral foot.     Musculoskeletal:  Rectus foot type noted bilaterally. Manual muscle strength is graded at 5/5 for all groups tested bilateral foot. Mild halluxabductovalgus and tailors bunion deformity noted bilaterally. No pain or crepitus noted with range of motion of the joints of the foot or ankle.   Decreased ankle joint dorsiflexion noted bilateral lower extremity. Assessment:      Diagnosis Orders   1. Pain in toes of both feet  [place] REMOVAL OF NAIL BED   2. Ingrowing nail  UT REMOVAL OF NAIL BED   3.  Chronic bilateral low back pain with right-sided sciatica  Willian Jackson MD, Pain [facility]       Plan:     OPERATIVE REPORT        PATIENT NAME: Tristian Maher              DATE OF SURGERY: 6/13/22      SURGEON: Elvia Hager DPM     PRE-OP DIAGNOSIS: Onychocryptosis bilateral hallux bilateral nail border                                POST-OP DIAGNOSIS: Same    The risks and complications of the procedure including, but not limited to infection, regrowth of the nail, and chemical burn were discussed with the patient in detail. The patient voiced understanding and wishes to proceed with the procedure. Following the administration of local anesthesia the patient was prepped and draped in the usual aseptic manner. The following procedures were then performed:     Procedure: Onychoplasty bilateral border bilateral hallux               Attention was directed to the right hallux incurvated digital nail plate borders as well as adjacent skin surfaces where utilizing blunt debridement, approximately 5 mm of each nail plate border was released from the underlying nail bed and removed from the operative site in total. All suspicious looking tissue, especially within the nail grooves, was removed. Each nail matrix was then cauterized with 88% phenol solution 3 times for 30 seconds each application and was then diluted with isopropyl alcohol. Antibiotic ointment and a dry, sterile dressing was applied. Attention was directed to the left hallux incurvated digital nail plate borders as well as adjacent skin surfaces where utilizing blunt debridement, approximately 5 mm of each nail plate border was released from the underlying nail bed and removed from the operative site in total. All suspicious looking tissue, especially within the nail grooves, was removed. Each nail matrix was then cauterized with 88% phenol solution 3 times for 30 seconds each application and was then diluted with isopropyl alcohol. Antibiotic ointment and a dry, sterile dressing was applied.        The patient tolerated the procedure well and left the operating room in apparent good condition with vital signs stable and capillary filling time within normal limits. Postoperative instructions given prior to discharge. Orders Placed This Encounter   Medications    lidocaine 1 % injection 2.5 mL    bupivacaine (MARCAINE) 0.5 % injection 12.5 mg    ibuprofen (ADVIL;MOTRIN) 800 MG tablet     Sig: Take 1 tablet by mouth 2 times daily as needed for Pain     Dispense:  14 tablet     Refill:  0         Chronic low back pain with sciatica: Patient referred to Dr. Manny Clark for assessment. Orders Placed This Encounter   Procedures   Natasha Gonzalez MD, Pain Management, [place]     Referral Priority:   Routine     Referral Type:   Eval and Treat     Referral Reason:   Specialty Services Required     Referred to Provider:   Rosy Varela MD     Requested Specialty:   Physical Medicine and Rehab     Number of Visits Requested:   1    [place] REMOVAL OF NAIL BED     B/l big toe, b/l border           Follow up:  Return in about 2 weeks (around 6/27/2022). Andrzej Singleton DPM        Please note that this report has been partially produced using speech recognition software which may cause errors including grammar, punctuation, and spelling or words and phrases that may seem inappropriate. If there are questions or concerns please feel free to contact me for clarification.

## 2022-07-27 ENCOUNTER — TELEPHONE (OUTPATIENT)
Dept: PAIN MANAGEMENT | Age: 27
End: 2022-07-27

## 2022-07-27 NOTE — TELEPHONE ENCOUNTER
CALLED THE PATIENT TO RESCHEUDE AND THE PATIENT STATED HE DID NOT KNOW HE HAD THE APPOITMENT, I OFFERED TO RESCHEDULE AND THE PATIENT DECLINED. HE STATED HE IS GOING SOME WHERE ELSE FOR HIS BACK.

## 2023-03-28 DIAGNOSIS — N52.9 ERECTILE DYSFUNCTION, UNSPECIFIED ERECTILE DYSFUNCTION TYPE: ICD-10-CM

## 2023-03-28 RX ORDER — TADALAFIL 20 MG/1
1 TABLET ORAL AS NEEDED
COMMUNITY
Start: 2020-12-08 | End: 2023-03-28 | Stop reason: SDUPTHER

## 2023-03-29 RX ORDER — TADALAFIL 20 MG/1
20 TABLET ORAL DAILY PRN
Qty: 10 TABLET | Refills: 6 | Status: SHIPPED | OUTPATIENT
Start: 2023-03-29 | End: 2023-04-25 | Stop reason: SDUPTHER

## 2023-04-25 ENCOUNTER — OFFICE VISIT (OUTPATIENT)
Dept: PRIMARY CARE | Facility: CLINIC | Age: 28
End: 2023-04-25
Payer: COMMERCIAL

## 2023-04-25 VITALS
BODY MASS INDEX: 41.75 KG/M2 | HEIGHT: 73 IN | OXYGEN SATURATION: 97 % | DIASTOLIC BLOOD PRESSURE: 86 MMHG | WEIGHT: 315 LBS | SYSTOLIC BLOOD PRESSURE: 118 MMHG | HEART RATE: 99 BPM

## 2023-04-25 DIAGNOSIS — E66.01 CLASS 3 SEVERE OBESITY DUE TO EXCESS CALORIES WITH SERIOUS COMORBIDITY AND BODY MASS INDEX (BMI) OF 45.0 TO 49.9 IN ADULT (MULTI): ICD-10-CM

## 2023-04-25 DIAGNOSIS — G47.33 OSA (OBSTRUCTIVE SLEEP APNEA): Primary | ICD-10-CM

## 2023-04-25 DIAGNOSIS — L65.9 ALOPECIA: ICD-10-CM

## 2023-04-25 DIAGNOSIS — N52.9 ERECTILE DYSFUNCTION, UNSPECIFIED ERECTILE DYSFUNCTION TYPE: ICD-10-CM

## 2023-04-25 DIAGNOSIS — E55.9 VITAMIN D DEFICIENCY: ICD-10-CM

## 2023-04-25 DIAGNOSIS — R53.83 FATIGUE, UNSPECIFIED TYPE: ICD-10-CM

## 2023-04-25 DIAGNOSIS — E78.5 DYSLIPIDEMIA: ICD-10-CM

## 2023-04-25 DIAGNOSIS — M47.816 OSTEOARTHRITIS OF LUMBAR SPINE, UNSPECIFIED SPINAL OSTEOARTHRITIS COMPLICATION STATUS: ICD-10-CM

## 2023-04-25 DIAGNOSIS — R73.9 HYPERGLYCEMIA: ICD-10-CM

## 2023-04-25 DIAGNOSIS — R29.818 SUSPECTED SLEEP APNEA: ICD-10-CM

## 2023-04-25 PROBLEM — F32.A DEPRESSION: Status: ACTIVE | Noted: 2023-04-25

## 2023-04-25 PROBLEM — E66.813 CLASS 3 SEVERE OBESITY DUE TO EXCESS CALORIES WITH SERIOUS COMORBIDITY AND BODY MASS INDEX (BMI) OF 45.0 TO 49.9 IN ADULT: Status: ACTIVE | Noted: 2023-04-25

## 2023-04-25 PROCEDURE — 99214 OFFICE O/P EST MOD 30 MIN: CPT | Performed by: FAMILY MEDICINE

## 2023-04-25 PROCEDURE — 3008F BODY MASS INDEX DOCD: CPT | Performed by: FAMILY MEDICINE

## 2023-04-25 PROCEDURE — 3074F SYST BP LT 130 MM HG: CPT | Performed by: FAMILY MEDICINE

## 2023-04-25 PROCEDURE — 1036F TOBACCO NON-USER: CPT | Performed by: FAMILY MEDICINE

## 2023-04-25 PROCEDURE — 3079F DIAST BP 80-89 MM HG: CPT | Performed by: FAMILY MEDICINE

## 2023-04-25 RX ORDER — IBUPROFEN 800 MG/1
800 TABLET ORAL 3 TIMES DAILY PRN
Qty: 90 TABLET | Refills: 3 | Status: SHIPPED | OUTPATIENT
Start: 2023-04-25 | End: 2024-03-21 | Stop reason: SDUPTHER

## 2023-04-25 RX ORDER — KETOCONAZOLE 20 MG/ML
SHAMPOO, SUSPENSION TOPICAL 2 TIMES WEEKLY
Qty: 120 ML | Refills: 3 | Status: SHIPPED | OUTPATIENT
Start: 2023-04-27 | End: 2024-03-21 | Stop reason: SDUPTHER

## 2023-04-25 RX ORDER — KETOCONAZOLE 20 MG/ML
SHAMPOO, SUSPENSION TOPICAL 2 TIMES WEEKLY
COMMUNITY
End: 2023-04-25 | Stop reason: SDUPTHER

## 2023-04-25 RX ORDER — PHENTERMINE HYDROCHLORIDE 37.5 MG/1
TABLET ORAL
Qty: 30 TABLET | Refills: 0 | Status: SHIPPED | OUTPATIENT
Start: 2023-04-25 | End: 2023-06-22 | Stop reason: ALTCHOICE

## 2023-04-25 RX ORDER — IBUPROFEN 800 MG/1
800 TABLET ORAL 3 TIMES DAILY PRN
COMMUNITY
End: 2023-04-25 | Stop reason: SDUPTHER

## 2023-04-25 RX ORDER — PHENTERMINE HYDROCHLORIDE 37.5 MG/1
37.5 TABLET ORAL
Qty: 30 TABLET | Refills: 0 | Status: SHIPPED | OUTPATIENT
Start: 2023-04-25 | End: 2023-04-25 | Stop reason: SDUPTHER

## 2023-04-25 RX ORDER — METHOCARBAMOL 500 MG/1
500 TABLET, FILM COATED ORAL NIGHTLY
COMMUNITY
End: 2023-04-25 | Stop reason: SDUPTHER

## 2023-04-25 RX ORDER — SEMAGLUTIDE 0.25 MG/.5ML
0.25 INJECTION, SOLUTION SUBCUTANEOUS
Qty: 0.5 ML | Refills: 3 | Status: SHIPPED
Start: 2023-04-25 | End: 2023-06-22 | Stop reason: ALTCHOICE

## 2023-04-25 RX ORDER — METHOCARBAMOL 500 MG/1
500 TABLET, FILM COATED ORAL NIGHTLY
Qty: 90 TABLET | Refills: 0 | Status: SHIPPED | OUTPATIENT
Start: 2023-04-25

## 2023-04-25 RX ORDER — MINOCYCLINE HYDROCHLORIDE 100 MG/1
1 CAPSULE ORAL 2 TIMES DAILY
COMMUNITY
Start: 2022-12-20 | End: 2023-07-11 | Stop reason: SDUPTHER

## 2023-04-25 RX ORDER — TADALAFIL 20 MG/1
20 TABLET ORAL DAILY PRN
Qty: 10 TABLET | Refills: 6 | Status: SHIPPED | OUTPATIENT
Start: 2023-04-25 | End: 2023-05-11 | Stop reason: SDUPTHER

## 2023-04-25 NOTE — PROGRESS NOTES
Subjective   Patient ID: Justin Pennignton is a 27 y.o. male who presents for Snoring.    HPI   Patient would like to discuss possible MIKY. He c/o loud snoring at night and waking up gasping for air. He states snoring has gotten worse lately and feels maybe could be due to weight gain as well. He would like an order for a sleep study.     Review of Systems  12 Systems have been reviewed as follows.  Constitutional: Fever, weight gain, weight loss, appetite change, night sweats, fatigue, chills.  Eyes : blurry, double vision, vision, loss, tearing, redness, pain, sensitivity to light, glaucoma.  Ears, nose, mouth, and throat: Hearing loss, ringing in the ears, ear pain, nasal congestion, nasal drainage, nosebleeds, mouth, throat, irritation tooth problem.  Cardiovascular :chest pain, pressure, heart racing, palpitations, sweating, leg swelling, high or low blood pressure  Pulmonary: Cough, yellow or green sputum, blood and sputum, shortness of breath, wheezing  Gastrointestinal: Nausea, vomiting, diarrhea, constipation, pain, blood in stool, or vomitus, heartburn, difficulty swallowing  Genitourinary: incontinence, abnormal bleeding, abnormal discharge, urinary frequency, urinary hesitancy, pain, impotence sexual problem, infection, urinary retention  Musculoskeletal: Pain, stiffness, joint, redness or warmth, arthritis, back pain, weakness, muscle wasting, sprain or fracture  Neuro: Weight weakness, dizziness, change in voice, change in taste change in vision, change in hearing, loss, or change of sensation, trouble walking, balance problems coordination problems, shaking, speech problem  Endocrine , cold or heat intolerance, blood sugar problem, weight gain or loss missed periods hot flashes, sweats, change in body hair, change in libido, increased thirst, increased urination  Heme/lymph: Swelling, bleeding, problem anemia, bruising, enlarged lymph nodes  Allergic/immunologic: H. plus nasal drip, watery itchy eyes,  "nasal drainage, immunosuppressed  The above were reviewed and noted negative except as noted in HPI and Problem List.    Objective   /86 (BP Location: Right arm, Patient Position: Sitting)   Pulse 99   Ht 1.854 m (6' 1\")   Wt (!) 156 kg (344 lb)   SpO2 97%   BMI 45.39 kg/m²     Physical Exam  Constitutional: Well developed, well nourished, alert and in no acute distress   Eyes: Normal external exam. Pupils equally round and reactive to light with normal accommodation and extraocular movements intact.  Neck: Supple, no lymphadenopathy or masses.   Cardiovascular: Regular rate and rhythm, normal S1 and S2, no murmurs, gallops, or rubs. Radial pulses normal. No peripheral edema.  Pulmonary: No respiratory distress, lungs clear to auscultation bilaterally. No wheezes, rhonchi, rales.  Abdomen: soft,non tender, non distended, without masses or HSM  Skin: Warm, well perfused, normal skin turgor and color.   Neurologic: Cranial nerves II-XII grossly intact.   Psychiatric: Mood calm and affect normal  Musculoskeletal: Moving all extremities without restriction    Assessment/Plan   Problem List Items Addressed This Visit          Genitourinary    Erectile dysfunction       Musculoskeletal    Osteoarthritis of lumbar spine       Other    Alopecia      Patient was advised importance of proper diet/nutrition in addition adequate hydration. Patient was encouraged moderate exercise program to include 30 minutes daily for 5 days of the week or 150 minutes weekly. Patient will follow-up with us as scheduled.     Begin taking Adipex    Obtain fasting BW prior to next visit     Perform home sleep study    continue all current medications and therapy for chronic medical conditions     Scribe Attestation  By signing my name below, Jules BALBUENA RMA   , Scrrigoberto   attest that this documentation has been prepared under the direction and in the presence of Rob Sharma MD.     Provider Attestation - Scribe " documentation    All medical record entries made by the Scribe were at my direction and personally dictated by me. I have reviewed the chart and agree that the record accurately reflects my personal performance of the history, physical exam, discussion and plan.

## 2023-05-11 DIAGNOSIS — N52.9 ERECTILE DYSFUNCTION, UNSPECIFIED ERECTILE DYSFUNCTION TYPE: ICD-10-CM

## 2023-05-12 RX ORDER — TADALAFIL 20 MG/1
20 TABLET ORAL DAILY PRN
Qty: 30 TABLET | Refills: 6 | Status: SHIPPED
Start: 2023-05-12 | End: 2023-05-15 | Stop reason: SDUPTHER

## 2023-05-15 ENCOUNTER — TELEMEDICINE (OUTPATIENT)
Dept: PHARMACY | Facility: HOSPITAL | Age: 28
End: 2023-05-15
Payer: COMMERCIAL

## 2023-05-15 DIAGNOSIS — E66.01 CLASS 3 SEVERE OBESITY DUE TO EXCESS CALORIES WITH SERIOUS COMORBIDITY AND BODY MASS INDEX (BMI) OF 45.0 TO 49.9 IN ADULT (MULTI): ICD-10-CM

## 2023-05-15 DIAGNOSIS — N52.9 ERECTILE DYSFUNCTION, UNSPECIFIED ERECTILE DYSFUNCTION TYPE: ICD-10-CM

## 2023-05-15 DIAGNOSIS — R73.9 HYPERGLYCEMIA: ICD-10-CM

## 2023-05-15 RX ORDER — TADALAFIL 20 MG/1
20 TABLET ORAL DAILY PRN
Qty: 30 TABLET | Refills: 6 | Status: SHIPPED | OUTPATIENT
Start: 2023-05-15

## 2023-05-15 NOTE — PROGRESS NOTES
Pharmacist Clinic: Weight Management    Justin Pennington was referred to the Clinical Pharmacy Team for weight management.     Referring Provider: Malachi Jerry DO  Problem List Items Addressed This Visit          Endocrine/Metabolic    BMI 45.0-49.9, adult (CMS/[facility])    Class 3 severe obesity due to excess calories with serious comorbidity and body mass index (BMI) of 45.0 to 49.9 in adult (CMS/[facility])     Other Visit Diagnoses       Hyperglycemia                HISTORY OF PRESENT ILLNESS    Spoke with patient briefly regarding weight loss management. Patient states he has not taken Adipex, and has been using intermittent fasting/exercise to help lose weight. Patient states he has been successful with this method and has lost some weight since starting. Applauded patient for continued efforts.    PHARMACY ASSESSMENT    Allergies: Patient has no known allergies.     [id number] - [place], [place] - [address] [facility]  [address] [facility] [address]  Phone: [phone] Fax: [phone]    [id number] - [place], [place] - [address] [facility]  [address] [address] [address]  Phone: [phone] Fax: [phone]    [facility] [address] [place], [place] - [address]  [address] [facility] [address]  Phone: [phone] Fax: [phone]    [facility] - [place], [place] - [address]  [address] [place] [address] [address]  [facility] [address]  Phone: [phone] Fax: [phone]    CURRENT PHARMACOTHERAPY  - N/a    DRUG INTERACTIONS  - No significant drug-drug interactions exist that require adjustment to therapy    LAB  Lab Results   Component Value Date    BILITOT 0.4 07/15/2020    CALCIUM 9.4 07/15/2020    CO2 31 07/15/2020     07/15/2020    CREATININE 0.90 07/15/2020    GLUCOSE 92 07/15/2020    ALKPHOS 62 07/15/2020    K 4.4 07/15/2020    PROT 7.2 07/15/2020     07/15/2020    AST 25 07/15/2020    ALT 65 (H) 07/15/2020    BUN 11 07/15/2020    ANIONGAP 10 07/15/2020     ALBUMIN 4.4 07/15/2020     Lab Results   Component Value Date    TRIG 369 (H) 07/15/2020    CHOL 167 07/15/2020    HDL 26.8 (A) 07/15/2020     No results found for: HGBA1C    RECOMMENDATIONS/PLAN  1. Continue current lifestyle management. Will defer weight loss management to Dr. Sharma.  2. Patient's insurance will not cover Wegovy, however [employer] does cover Trulicity.    Clinical Pharmacist follow up: As needed    Thank you,  Zoë Rubi, PharmD    Verbal consent to manage patient's drug therapy was obtained from patient. They were informed they may decline to participate or withdraw from participation in pharmacy services at any time.

## 2023-06-22 ENCOUNTER — OFFICE VISIT (OUTPATIENT)
Dept: PRIMARY CARE | Facility: CLINIC | Age: 28
End: 2023-06-22
Payer: COMMERCIAL

## 2023-06-22 VITALS
HEART RATE: 77 BPM | HEIGHT: 73 IN | WEIGHT: 315 LBS | OXYGEN SATURATION: 97 % | RESPIRATION RATE: 14 BRPM | BODY MASS INDEX: 41.75 KG/M2 | DIASTOLIC BLOOD PRESSURE: 77 MMHG | SYSTOLIC BLOOD PRESSURE: 128 MMHG

## 2023-06-22 DIAGNOSIS — R16.1 SPLEEN ENLARGED: ICD-10-CM

## 2023-06-22 DIAGNOSIS — D75.1 POLYCYTHEMIA: ICD-10-CM

## 2023-06-22 PROBLEM — E66.01 MORBID OBESITY (MULTI): Status: RESOLVED | Noted: 2023-04-25 | Resolved: 2023-06-22

## 2023-06-22 PROBLEM — R07.81 PLEURITIC CHEST PAIN: Status: ACTIVE | Noted: 2023-06-22

## 2023-06-22 PROBLEM — M54.16 LUMBAR RADICULOPATHY: Status: ACTIVE | Noted: 2023-06-22

## 2023-06-22 PROBLEM — M54.30 SCIATICA: Status: ACTIVE | Noted: 2023-06-22

## 2023-06-22 PROCEDURE — 99214 OFFICE O/P EST MOD 30 MIN: CPT | Performed by: FAMILY MEDICINE

## 2023-06-22 ASSESSMENT — ENCOUNTER SYMPTOMS
GASTROINTESTINAL NEGATIVE: 1
HEMATOLOGIC/LYMPHATIC NEGATIVE: 1
RESPIRATORY NEGATIVE: 1
MUSCULOSKELETAL NEGATIVE: 1
CARDIOVASCULAR NEGATIVE: 1
ALLERGIC/IMMUNOLOGIC NEGATIVE: 1
CONSTITUTIONAL NEGATIVE: 1
EYES NEGATIVE: 1
ENDOCRINE NEGATIVE: 1
PSYCHIATRIC NEGATIVE: 1
NEUROLOGICAL NEGATIVE: 1

## 2023-06-22 NOTE — PATIENT INSTRUCTIONS
Obtain labs     Obtain US     Follow up one week after US    Burow's Graft Text: The defect edges were debeveled with a #15 scalpel blade.  Given the location of the defect, shape of the defect, the proximity to free margins and the presence of a standing cone deformity a Burow's skin graft was deemed most appropriate. The standing cone was removed and this tissue was then trimmed to the shape of the primary defect. The adipose tissue was also removed until only dermis and epidermis were left.  The skin margins of the secondary defect were undermined to an appropriate distance in all directions utilizing iris scissors.  The secondary defect was closed with interrupted buried subcutaneous sutures.  The skin edges were then re-apposed with running  sutures.  The skin graft was then placed in the primary defect and oriented appropriately.

## 2023-06-22 NOTE — PROGRESS NOTES
"bvSubjective   Patient ID: Justin Pennington is a 28 y.o. male who presents for an ER follow up     HPI Pt  states he went to [facility] ER on June 15th for pain in his chest, they did a X-ray,blood work and states they said his spleen was enlarged. Pt states he is feeling better.    Review of Systems   Constitutional: Negative.    HENT: Negative.     Eyes: Negative.    Respiratory: Negative.     Cardiovascular: Negative.    Gastrointestinal: Negative.    Endocrine: Negative.    Genitourinary: Negative.    Musculoskeletal: Negative.    Skin: Negative.    Allergic/Immunologic: Negative.    Neurological: Negative.    Hematological: Negative.    Psychiatric/Behavioral: Negative.         Objective   /77 (BP Location: Left arm, Patient Position: Sitting, BP Cuff Size: Adult)   Pulse 77   Resp 14   Ht 1.854 m (6' 1\")   Wt (!) 151 kg (333 lb)   SpO2 97%   BMI 43.93 kg/m²     Physical Exam CONSTITUTIONAL- NAD, Pt is A & O x3, Vital signs reviewed per chart.  General Appearance- normal , good hygiene,talks easily  EYES-PERRLA conjunctiva and lids- normal  EARS/NOSE-TM's normal, head normocephalic and atraumatic, naso pharynx-no nasal discharge, no trismus,  oropharynx- normal without exudate  NECK- supple, FROM, without mass  thyroid- NT, normal size, no nodule noted  LYMPH- WNL  CV- RRR without murmur, gallop, or other abnormality.  PULM- CTA bilaterally  Respiratory effort- normal respiratory effort , no retractions or nasal flaring  ABDOMEN- normoactive BS's , soft , NT, no hepatosplenomegaly palpated, or masses. No pulsatile masses noted  extremities no edema,NT  SKIN- no abnormal skin lesions on inspection or palpation  neuro- no focal deficits  PSYCH- pleasant, normal judgement and insight     Assessment/Plan   Problem List Items Addressed This Visit    None  Visit Diagnoses       Spleen enlarged        reported by ER with no study done.    Relevant Orders    US abdomen complete    CBC    Comprehensive " Metabolic Panel    Polycythemia        Relevant Orders    US abdomen complete    CBC    Comprehensive Metabolic Panel          Extensive record review from multiple outside sources completed today.  No spleen enlargement was noted on physical exam or in any prior diagnostic study that was available to me at this time.  We believe he was told that his spleen was enlarged because his hemoglobin and hematocrit were elevated.  This may have been the patient's interpretation of the ER events.   Scribe Attestation  By signing my name below, I, Jyoti Mckeon MA  , Scribe   attest that this documentation has been prepared under the direction and in the presence of Malachi Jerry DO.

## 2023-06-28 ENCOUNTER — TELEPHONE (OUTPATIENT)
Dept: PRIMARY CARE | Facility: CLINIC | Age: 28
End: 2023-06-28
Payer: COMMERCIAL

## 2023-06-28 NOTE — TELEPHONE ENCOUNTER
Per JE pts results look normal. There are some very mild abnormalities that are nothing at all to worry about.

## 2023-06-28 NOTE — TELEPHONE ENCOUNTER
PT RECEIVED HIS ULTRASOUND RESULTS AND IS WORRIED ABOUT HIS LIVER AND SPLEEN AFTER LOOKING AT HIS RESULTS ON MY CHART.  HE IS LEAVING TO GO OUT OF TOWN ON FRIDAY, WAS HOPPING HE CAN GET A CALL REAL QUICK TO LET HIM KNOW WHAT'S GOING ON

## 2023-06-29 PROBLEM — K76.0 FATTY LIVER: Status: ACTIVE | Noted: 2023-06-29

## 2023-07-11 ENCOUNTER — OFFICE VISIT (OUTPATIENT)
Dept: PRIMARY CARE | Facility: CLINIC | Age: 28
End: 2023-07-11
Payer: COMMERCIAL

## 2023-07-11 VITALS
OXYGEN SATURATION: 97 % | BODY MASS INDEX: 41.75 KG/M2 | DIASTOLIC BLOOD PRESSURE: 74 MMHG | WEIGHT: 315 LBS | HEART RATE: 91 BPM | HEIGHT: 73 IN | RESPIRATION RATE: 18 BRPM | SYSTOLIC BLOOD PRESSURE: 130 MMHG

## 2023-07-11 DIAGNOSIS — L70.9 ACNE, UNSPECIFIED ACNE TYPE: ICD-10-CM

## 2023-07-11 DIAGNOSIS — K76.0 FATTY LIVER: ICD-10-CM

## 2023-07-11 PROBLEM — E66.01 CLASS 3 SEVERE OBESITY DUE TO EXCESS CALORIES WITH SERIOUS COMORBIDITY AND BODY MASS INDEX (BMI) OF 45.0 TO 49.9 IN ADULT (MULTI): Status: RESOLVED | Noted: 2023-04-25 | Resolved: 2023-07-11

## 2023-07-11 PROBLEM — E66.813 CLASS 3 SEVERE OBESITY DUE TO EXCESS CALORIES WITH SERIOUS COMORBIDITY AND BODY MASS INDEX (BMI) OF 45.0 TO 49.9 IN ADULT: Status: RESOLVED | Noted: 2023-04-25 | Resolved: 2023-07-11

## 2023-07-11 PROCEDURE — 99213 OFFICE O/P EST LOW 20 MIN: CPT | Performed by: FAMILY MEDICINE

## 2023-07-11 RX ORDER — MINOCYCLINE HYDROCHLORIDE 100 MG/1
100 CAPSULE ORAL 2 TIMES DAILY
Qty: 90 CAPSULE | Refills: 0 | Status: SHIPPED | OUTPATIENT
Start: 2023-07-11 | End: 2024-03-21 | Stop reason: SDUPTHER

## 2023-07-11 ASSESSMENT — ENCOUNTER SYMPTOMS
HEMATOLOGIC/LYMPHATIC NEGATIVE: 1
GASTROINTESTINAL NEGATIVE: 1
PSYCHIATRIC NEGATIVE: 1
CONSTITUTIONAL NEGATIVE: 1
MUSCULOSKELETAL NEGATIVE: 1
EYES NEGATIVE: 1
NEUROLOGICAL NEGATIVE: 1
ALLERGIC/IMMUNOLOGIC NEGATIVE: 1
ENDOCRINE NEGATIVE: 1
CARDIOVASCULAR NEGATIVE: 1
RESPIRATORY NEGATIVE: 1

## 2023-07-11 NOTE — PROGRESS NOTES
"Subjective   Patient ID: Justin Pennington is a 28 y.o. male who presents for a follow up on possible enlarged spleen.     HPI Pt had a US of his Abdomen and would like to review this today. Denies any pain or discomfort at this time. We did this study because the ER told pt he had an enlarged spleen but pt had no tests done documenting this issue.     Review of Systems   Constitutional: Negative.    HENT: Negative.     Eyes: Negative.    Respiratory: Negative.     Cardiovascular: Negative.    Gastrointestinal: Negative.    Endocrine: Negative.    Genitourinary: Negative.    Musculoskeletal: Negative.    Skin: Negative.    Allergic/Immunologic: Negative.    Neurological: Negative.    Hematological: Negative.    Psychiatric/Behavioral: Negative.         Objective   /74 (BP Location: Left arm, Patient Position: Sitting, BP Cuff Size: Large adult)   Pulse 91   Resp 18   Ht 1.854 m (6' 1\")   Wt 150 kg (330 lb)   SpO2 97%   BMI 43.54 kg/m²     Physical Exam CONSTITUTIONAL- NAD, Pt is A & O x3, Vital signs reviewed per chart.  General Appearance- normal , good hygiene,talks easily  EYES-PERRLA conjunctiva and lids- normal  EARS/NOSE-TM's normal, head normocephalic and atraumatic, naso pharynx-no nasal discharge, no trismus,  oropharynx- normal without exudate  NECK- supple, FROM, without mass  thyroid- NT, normal size, no nodule noted  LYMPH- WNL  CV- RRR without murmur, gallop, or other abnormality.  PULM- CTA bilaterally  Respiratory effort- normal respiratory effort , no retractions or nasal flaring  ABDOMEN- normoactive BS's , soft , NT, no hepatosplenomegaly palpated, or masses. No pulsatile masses noted  extremities no edema,NT  SKIN- no abnormal skin lesions on inspection or palpation  neuro- no focal deficits  PSYCH- pleasant, normal judgement and insight     Assessment/Plan   Problem List Items Addressed This Visit       Fatty liver        Scribe Attestation  By signing my name below, I, Jyoti Mckeon MA  " , Scribe   attest that this documentation has been prepared under the direction and in the presence of Malachi Jerry DO.

## 2024-01-05 ENCOUNTER — PATIENT MESSAGE (OUTPATIENT)
Dept: PRIMARY CARE | Facility: CLINIC | Age: 29
End: 2024-01-05
Payer: COMMERCIAL

## 2024-01-05 DIAGNOSIS — G47.33 OSA (OBSTRUCTIVE SLEEP APNEA): ICD-10-CM

## 2024-01-08 NOTE — TELEPHONE ENCOUNTER
From: Justin Pennington  To: Malachi Jerry DO  Sent: 2024 2:05 PM EST  Subject: Sleep Study Test    Hel Dr. Jerry,   My dentist suggested that I get my wisdom teeth removed. They referred me to [facility]. [facility] is going to be doing the procedure under general anesthesia on .     They have evaluated me and based on my snoring and holding my breath habits while asleep, they would like to see if you can get me scheduled for a sleep study test prior to my surgery date on 2024.     I would greatly appreciate it if you could get me referred to the [facility] to have a sleep study done as soon as possible.     Thank you so much and have a great day.     Justin Pennington  : 89

## 2024-02-22 ENCOUNTER — CLINICAL SUPPORT (OUTPATIENT)
Dept: SLEEP MEDICINE | Facility: CLINIC | Age: 29
End: 2024-02-22
Payer: COMMERCIAL

## 2024-02-22 VITALS — HEIGHT: 73 IN | WEIGHT: 315 LBS | BODY MASS INDEX: 41.75 KG/M2

## 2024-02-22 DIAGNOSIS — G47.33 OSA (OBSTRUCTIVE SLEEP APNEA): ICD-10-CM

## 2024-02-22 PROCEDURE — 95810 POLYSOM 6/> YRS 4/> PARAM: CPT | Performed by: SPECIALIST

## 2024-02-23 ASSESSMENT — SLEEP AND FATIGUE QUESTIONNAIRES
HOW LIKELY ARE YOU TO NOD OFF OR FALL ASLEEP WHILE WATCHING TV: SLIGHT CHANCE OF DOZING
HOW LIKELY ARE YOU TO NOD OFF OR FALL ASLEEP WHEN YOU ARE A PASSENGER IN A CAR FOR AN HOUR WITHOUT A BREAK: WOULD NEVER DOZE
SITING INACTIVE IN A PUBLIC PLACE LIKE A CLASS ROOM OR A MOVIE THEATER: SLIGHT CHANCE OF DOZING
HOW LIKELY ARE YOU TO NOD OFF OR FALL ASLEEP WHILE SITTING QUIETLY AFTER LUNCH WITHOUT ALCOHOL: WOULD NEVER DOZE
HOW LIKELY ARE YOU TO NOD OFF OR FALL ASLEEP WHILE SITTING AND TALKING TO SOMEONE: WOULD NEVER DOZE
ESS-CHAD TOTAL SCORE: 5
HOW LIKELY ARE YOU TO NOD OFF OR FALL ASLEEP IN A CAR, WHILE STOPPED FOR A FEW MINUTES IN TRAFFIC: WOULD NEVER DOZE
HOW LIKELY ARE YOU TO NOD OFF OR FALL ASLEEP WHILE LYING DOWN TO REST IN THE AFTERNOON WHEN CIRCUMSTANCES PERMIT: MODERATE CHANCE OF DOZING
HOW LIKELY ARE YOU TO NOD OFF OR FALL ASLEEP WHILE SITTING AND READING: SLIGHT CHANCE OF DOZING

## 2024-02-23 NOTE — PROGRESS NOTES
[facility] TECH NOTE:     Patient: Justin Pennington   MRN//AGE: [id number]  1995  28 y.o.   Technologist: Hilaria Rick   Room: 1   Service Date: 2024        Sleep Testing Location: [facility]    [place]:     TECHNOLOGIST SLEEP STUDY PROCEDURE NOTE:   This sleep study is being conducted according to the policies and procedures outlined by the AAS accreditation standards.  The sleep study procedure and processes involved during this appointment was explained to the patient/patient’s family, questions were answered. The patient/family verbalized understanding.      The patient is a 28 y.o. year old male scheduled for a  Diagnostic PSG  with montage of:  Standard PSG . He arrived for his appointment.      The study that was ultimately completed was a Diagnostic PSG  with montage of:  Standard PSG .    The full study Was completed.  Patient questionnaires completed?: yes     Consents signed? yes    Initial Fall Risk Screening:     Justin has not fallen in the last 6 months. his did not result in injury. Justin does not have a fear of falling. He does not need assistance with sitting, standing, or walking. he does not need assistance walking in his home. he does not need assistance in an unfamiliar setting. The patient is notusing an assistive device.     Brief Study observations: Patient had difficulty with nasal cannula, tech cut nasal cannula down for patient comfort. Patient meet PSG split criteria with an AHI >30 per protocol. CPAP initiated using a [employer] DreamWear under the nose size medium. Patient stated CPAP pressure 4CMH20 is too much, EPR 2 used for patient comfort. Patient could not tolerate nasal mask due to mouth breathing. Switch mask to F&P Maicol full face size medium. Patient could not tolerate CPAP or CPAP masks. Patient educated on CPAP, patient became anxious. Switch back to diagnostic study. Patient advised to follow up with ordering physician.     Deviation to order/protocol and reason: AHI  >30 per PSG protocol, patient could not tolerate CPAP/ CPAP masks. Study remained as a diagnostic PSG.     If PAP, which was preferred mask/pressure/mode: N/A    Other:None    After the procedure, the patient/family was informed to ensure followup with ordering clinician for testing results.      Technologist: Hilaria Rick

## 2024-02-28 NOTE — RESULT ENCOUNTER NOTE
Please do referral to Dr. Castle, sleep specialist.  Notify the patient that his sleep study was abnormal and he should follow-up with the sleep specialist.

## 2024-03-11 ENCOUNTER — OFFICE VISIT (OUTPATIENT)
Dept: PODIATRY | Age: 29
End: 2024-03-11
Payer: COMMERCIAL

## 2024-03-11 VITALS — HEIGHT: 73 IN | TEMPERATURE: 97.5 F | WEIGHT: 315 LBS | BODY MASS INDEX: 41.75 KG/M2

## 2024-03-11 DIAGNOSIS — L60.0 INGROWING NAIL: ICD-10-CM

## 2024-03-11 DIAGNOSIS — M79.674 PAIN IN TOE OF RIGHT FOOT: Primary | ICD-10-CM

## 2024-03-11 PROCEDURE — 1036F TOBACCO NON-USER: CPT | Performed by: PODIATRIST

## 2024-03-11 PROCEDURE — 99213 OFFICE O/P EST LOW 20 MIN: CPT | Performed by: PODIATRIST

## 2024-03-11 PROCEDURE — G8417 CALC BMI ABV UP PARAM F/U: HCPCS | Performed by: PODIATRIST

## 2024-03-11 PROCEDURE — G8484 FLU IMMUNIZE NO ADMIN: HCPCS | Performed by: PODIATRIST

## 2024-03-11 PROCEDURE — G8427 DOCREV CUR MEDS BY ELIG CLIN: HCPCS | Performed by: PODIATRIST

## 2024-03-11 RX ORDER — MINOCYCLINE HYDROCHLORIDE 100 MG/1
CAPSULE ORAL
COMMUNITY

## 2024-03-11 RX ORDER — METHOCARBAMOL 500 MG/1
500 TABLET, FILM COATED ORAL
COMMUNITY
Start: 2023-04-25

## 2024-03-11 ASSESSMENT — ENCOUNTER SYMPTOMS
NAUSEA: 0
VOMITING: 0
SHORTNESS OF BREATH: 0
BACK PAIN: 1

## 2024-03-11 NOTE — PROGRESS NOTES
visit.       No past medical history on file.  No past surgical history on file.  Social History     Socioeconomic History    Marital status: Single     Spouse name: Not on file    Number of children: Not on file    Years of education: Not on file    Highest education level: Not on file   Occupational History    Not on file   Tobacco Use    Smoking status: Former     Current packs/day: 0.00     Types: Cigarettes     Quit date: 2019     Years since quittin.9    Smokeless tobacco: Never   Vaping Use    Vaping Use: Every day    Substances: Always   Substance and Sexual Activity    Alcohol use: Yes     Alcohol/week: 6.0 standard drinks of alcohol     Types: 6 Cans of beer per week    Drug use: Not Currently    Sexual activity: Not on file   Other Topics Concern    Not on file   Social History Narrative    Not on file     Social Determinants of Health     Financial Resource Strain: Not on file   Food Insecurity: Not on file   Transportation Needs: Not on file   Physical Activity: Not on file   Stress: Not on file   Social Connections: Not on file   Intimate Partner Violence: Not on file   Housing Stability: Not on file     Family History   Problem Relation Age of Onset    Cancer Father     Hypertension Father            Objective:   Vitals:  Temp 97.5 °F (36.4 °C)   Ht 1.854 m (6' 1\")   Wt (!) 154.2 kg (340 lb)   BMI 44.86 kg/m² Pain Score:   7      Physical Exam  Vitals reviewed.   Constitutional:       Appearance: He is obese.   HENT:      Head: Normocephalic and atraumatic.   Cardiovascular:      Pulses:           Dorsalis pedis pulses are 2+ on the right side and 2+ on the left side.        Posterior tibial pulses are 2+ on the right side and 2+ on the left side.      Comments: Minimal focal swelling noted to the medial nail fold of the right hallux.  There are no varicosities noted bilaterally.  There are no telangiectasia noted bilaterally.  Skin temperature gradient is noted to be warm to warm

## 2024-03-15 ENCOUNTER — OFFICE VISIT (OUTPATIENT)
Age: 29
End: 2024-03-15

## 2024-03-15 ENCOUNTER — TELEPHONE (OUTPATIENT)
Age: 29
End: 2024-03-15

## 2024-03-15 VITALS — TEMPERATURE: 97.4 F | HEIGHT: 73 IN | WEIGHT: 315 LBS | BODY MASS INDEX: 41.75 KG/M2

## 2024-03-15 DIAGNOSIS — M79.674 PAIN IN TOE OF RIGHT FOOT: ICD-10-CM

## 2024-03-15 DIAGNOSIS — L60.0 INGROWING NAIL: ICD-10-CM

## 2024-03-15 RX ORDER — LIDOCAINE HYDROCHLORIDE 10 MG/ML
1.5 INJECTION, SOLUTION EPIDURAL; INFILTRATION; INTRACAUDAL; PERINEURAL ONCE
Status: COMPLETED | OUTPATIENT
Start: 2024-03-15 | End: 2024-03-15

## 2024-03-15 RX ORDER — BUPIVACAINE HYDROCHLORIDE 5 MG/ML
1.5 INJECTION, SOLUTION EPIDURAL; INTRACAUDAL ONCE
Status: DISCONTINUED | OUTPATIENT
Start: 2024-03-15 | End: 2024-03-15

## 2024-03-15 RX ORDER — BUPIVACAINE HYDROCHLORIDE 2.5 MG/ML
1.5 INJECTION, SOLUTION EPIDURAL; INFILTRATION; INTRACAUDAL ONCE
Status: COMPLETED | OUTPATIENT
Start: 2024-03-15 | End: 2024-03-15

## 2024-03-15 RX ADMIN — BUPIVACAINE HYDROCHLORIDE 3.75 MG: 2.5 INJECTION, SOLUTION EPIDURAL; INFILTRATION; INTRACAUDAL at 12:43

## 2024-03-15 RX ADMIN — LIDOCAINE HYDROCHLORIDE 1.5 ML: 10 INJECTION, SOLUTION EPIDURAL; INFILTRATION; INTRACAUDAL; PERINEURAL at 10:00

## 2024-03-15 ASSESSMENT — ENCOUNTER SYMPTOMS
SHORTNESS OF BREATH: 0
BACK PAIN: 1
VOMITING: 0
NAUSEA: 0

## 2024-03-15 NOTE — PROGRESS NOTES
ointment and a dry, sterile dressing was applied.     The patient tolerated the procedure well and left the operating room in apparent good condition with vital signs stable and capillary filling time within normal limits. Postoperative instructions given prior to discharge.      Orders Placed This Encounter   Medications    lidocaine PF 1 % injection 1.5 mL    DISCONTD: BUPivacaine (PF) (MARCAINE) 0.5 % injection 7.5 mg    BUPivacaine (PF) (MARCAINE) 0.25 % injection 3.75 mg         Follow up:  Return in about 2 weeks (around 3/29/2024).    Avi Reatna DPM        Please note that this report has been partially produced using speech recognition software which may cause errors including grammar, punctuation, and spelling or words and phrases that may seem inappropriate. If there are questions or concerns please feel free to contact me for clarification.

## 2024-03-15 NOTE — TELEPHONE ENCOUNTER
Patient will call to make follow up appt w/ Dr. Retana at [facility] or [facility].  \"2 week post procedure\" procedure done 3/15/24

## 2024-03-15 NOTE — PATIENT INSTRUCTIONS
POST-OP INSTRUCTIONS FOR NAIL PROCEDURES    Elevate your foot above your heart for 2-3 hours after surgery to help reduce bleeding.     It is not unusual for this type of surgery to bleed, so if your dressing spots with blood do not get alarmed.     You may reinforce the dressing with gauze if you wish.     Keep your dressing clean, dry, and intact for 24 hours after the procedure.     After a day, remove the bandage and gently cleanse the area with warm, soapy water.     Apply a small amount of bacitracin, Neosporin or other antibacterial ointment to the wound and cover with a band aid.     Continue wound care until the wound is no longer draining.     Pain is usually controlled with a mild analgesic such as extra strength Tylenol 1-2 tablets every 6-8 hours, or Ibuprofen 200mg 1-2 tablets every 6-8 hours with a meal.  You may also try applying an ice pack directly to the toe for 10 minutes at a time (with at least 30 minutes between icing sessions to protect the skin).    If the pain is intolerable please phone the office.     Fever, increased redness, increased swelling, pus/ purulent drainage, and pain not controlled by medicine may be signs of infection, please call the office immediately.     If you have any other questions, please call the office at [phone]

## 2024-03-21 ENCOUNTER — OFFICE VISIT (OUTPATIENT)
Dept: PRIMARY CARE | Facility: CLINIC | Age: 29
End: 2024-03-21
Payer: COMMERCIAL

## 2024-03-21 VITALS
BODY MASS INDEX: 41.75 KG/M2 | HEIGHT: 73 IN | WEIGHT: 315 LBS | SYSTOLIC BLOOD PRESSURE: 126 MMHG | OXYGEN SATURATION: 97 % | RESPIRATION RATE: 16 BRPM | DIASTOLIC BLOOD PRESSURE: 78 MMHG | HEART RATE: 89 BPM

## 2024-03-21 DIAGNOSIS — K76.0 FATTY LIVER: ICD-10-CM

## 2024-03-21 DIAGNOSIS — L65.9 ALOPECIA: ICD-10-CM

## 2024-03-21 DIAGNOSIS — L70.9 ACNE, UNSPECIFIED ACNE TYPE: ICD-10-CM

## 2024-03-21 DIAGNOSIS — M47.816 OSTEOARTHRITIS OF LUMBAR SPINE, UNSPECIFIED SPINAL OSTEOARTHRITIS COMPLICATION STATUS: ICD-10-CM

## 2024-03-21 DIAGNOSIS — G47.33 OBSTRUCTIVE SLEEP APNEA SYNDROME: ICD-10-CM

## 2024-03-21 PROCEDURE — 99214 OFFICE O/P EST MOD 30 MIN: CPT | Performed by: FAMILY MEDICINE

## 2024-03-21 PROCEDURE — 3078F DIAST BP <80 MM HG: CPT | Performed by: FAMILY MEDICINE

## 2024-03-21 PROCEDURE — 3074F SYST BP LT 130 MM HG: CPT | Performed by: FAMILY MEDICINE

## 2024-03-21 PROCEDURE — 3008F BODY MASS INDEX DOCD: CPT | Performed by: FAMILY MEDICINE

## 2024-03-21 PROCEDURE — 1036F TOBACCO NON-USER: CPT | Performed by: FAMILY MEDICINE

## 2024-03-21 RX ORDER — MINOCYCLINE HYDROCHLORIDE 100 MG/1
100 CAPSULE ORAL 2 TIMES DAILY
Qty: 60 CAPSULE | Refills: 0 | Status: SHIPPED | OUTPATIENT
Start: 2024-03-21

## 2024-03-21 RX ORDER — KETOCONAZOLE 20 MG/ML
SHAMPOO, SUSPENSION TOPICAL 2 TIMES WEEKLY
Qty: 120 ML | Refills: 3 | Status: SHIPPED | OUTPATIENT
Start: 2024-03-21

## 2024-03-21 RX ORDER — SEMAGLUTIDE 0.68 MG/ML
0.25 INJECTION, SOLUTION SUBCUTANEOUS
Qty: 3 ML | Refills: 0 | COMMUNITY
Start: 2024-03-21 | End: 2024-04-15

## 2024-03-21 RX ORDER — IBUPROFEN 800 MG/1
800 TABLET ORAL 3 TIMES DAILY PRN
Qty: 90 TABLET | Refills: 3 | Status: SHIPPED | OUTPATIENT
Start: 2024-03-21

## 2024-03-21 NOTE — PROGRESS NOTES
Subjective   Patient ID: Mamadou Pennington is a 28 y.o. male who presents for Sleep Apnea.    HPI   Patient would like to review sleep study results done on 2/22/24 and discuss next move for CPAP.   He has never felt fatigued after a night sleep.  He was diagnosed on the report per pt of [facility].   He would like to discuss alternatives for the CPAP, electronic mechanisms if possible.     Review of Systems  12 Systems have been reviewed as follows.  Constitutional: Fever, weight gain, weight loss, appetite change, night sweats, fatigue, chills.  Eyes : blurry, double vision, vision, loss, tearing, redness, pain, sensitivity to light, glaucoma.  Ears, nose, mouth, and throat: Hearing loss, ringing in the ears, ear pain, nasal congestion, nasal drainage, nosebleeds, mouth, throat, irritation tooth problem.  Cardiovascular :chest pain, pressure, heart racing, palpitations, sweating, leg swelling, high or low blood pressure  Pulmonary: Cough, yellow or green sputum, blood and sputum, shortness of breath, wheezing  Gastrointestinal: Nausea, vomiting, diarrhea, constipation, pain, blood in stool, or vomitus, heartburn, difficulty swallowing  Genitourinary: incontinence, abnormal bleeding, abnormal discharge, urinary frequency, urinary hesitancy, pain, impotence sexual problem, infection, urinary retention  Musculoskeletal: Pain, stiffness, joint, redness or warmth, arthritis, back pain, weakness, muscle wasting, sprain or fracture  Neuro: Weight weakness, dizziness, change in voice, change in taste change in vision, change in hearing, loss, or change of sensation, trouble walking, balance problems coordination problems, shaking, speech problem  Endocrine , cold or heat intolerance, blood sugar problem, weight gain or loss missed periods hot flashes, sweats, change in body hair, change in libido, increased thirst, increased urination  Heme/lymph: Swelling, bleeding, problem anemia, bruising, enlarged lymph  "nodes  Allergic/immunologic: H. plus nasal drip, watery itchy eyes, nasal drainage, immunosuppressed  The above were reviewed and noted negative except as noted in HPI and Problem List.    Objective   /78 (BP Location: Left arm, Patient Position: Sitting, BP Cuff Size: Adult)   Pulse 89   Resp 16   Ht 1.854 m (6' 1\")   Wt (!) 155 kg (341 lb 3.2 oz)   SpO2 97%   BMI 45.02 kg/m²     Physical Exam  Constitutional: Well developed, well nourished, alert and in no acute distress   Eyes: Normal external exam. Pupils equally round and reactive to light with normal accommodation and extraocular movements intact.  Neck: Supple, no lymphadenopathy or masses.   Cardiovascular: Regular rate and rhythm, normal S1 and S2, no murmurs, gallops, or rubs. Radial pulses normal. No peripheral edema.  Pulmonary: No respiratory distress, lungs clear to auscultation bilaterally. No wheezes, rhonchi, rales.  Abdomen: soft,non tender, non distended, without masses or HSM  Skin: Warm, well perfused, normal skin turgor and color.   Neurologic: Cranial nerves II-XII grossly intact.   Psychiatric: Mood calm and affect normal  Musculoskeletal: Moving all extremities without restriction     Assessment/Plan   Problem List Items Addressed This Visit             ICD-10-CM    Alopecia L65.9    Relevant Orders    Follow Up In [facility] - PCP - Established    Osteoarthritis of lumbar spine M47.816    Relevant Orders    Follow Up In [facility] - PCP - Established    BMI 45.0-49.9, adult (CMS/[facility]) Z68.42    Relevant Orders    Follow Up In [facility] - PCP - Established    Fatty liver K76.0    Relevant Orders    Follow Up [facility] - PCP - Established    Obstructive sleep apnea syndrome G47.33    Relevant Orders    Follow Up In [facility] - PCP - Established     Other Visit Diagnoses         Codes    Acne, unspecified acne type     L70.9    Relevant Orders    Follow Up In [facility] - PCP - Established          Patient told he should not go under general anesthesia for wisdom teeth extraction. Patient should utilize local anesthesia for teeth extraction.     Sample of Ozempic given to patient.      Pharmacy consult    Consider adipex in future    continue all current medications and therapy for chronic medical conditions     Auto CPAP supplies ordered.     BW next    Scribe Attestation  By signing my name below, IJules RMA   , Scribe   attest that this documentation has been prepared under the direction and in the presence of Rob Sharma MD.     Provider Attestation - Scribe documentation    All medical record entries made by the Scribe were at my direction and personally dictated by me. I have reviewed the chart and agree that the record accurately reflects my personal performance of the history, physical exam, discussion and plan.

## 2024-04-15 ENCOUNTER — TELEMEDICINE (OUTPATIENT)
Dept: PHARMACY | Facility: HOSPITAL | Age: 29
End: 2024-04-15
Payer: COMMERCIAL

## 2024-04-15 NOTE — ASSESSMENT & PLAN NOTE
Patient would not like to start a GLP 1 medication due to the side effect profile.  Continue working towards healthy diet and lifestyle.

## 2024-04-15 NOTE — PROGRESS NOTES
"Pharmacist Clinic: Weight Management    Justin Pennington \"Mamadou\" was referred to the Clinical Pharmacy Team for weight management.     Referring Provider: Malachi Jerry DO    HISTORY OF PRESENT ILLNESS    Justin Pennington is a 28 year old male who presents to the Clinical Pharmacy team for weight management. Patient saw PCP on 3/21/2024 to discuss sleep apnea and his study results. Patient wanted to discuss alternatives for CPAP machine, and weight loss was one suggestion.    Patient was given a sample of Ozempic at his last PCP appointment. Patient states he and his wife discussed the pros and cons of the medication, and decided he would like to not start Ozempic. Patient states he was uneasy about the side effects of pancreatitis and diarrhea. Educated the patient on the medication to help patient understand how Ozempic works and the side effects of the medication.    Patient states he typically has about 2 meals per day, with little to no snacking. However, patient states he does usually over eat when he has those 2 meals.     PHARMACY ASSESSMENT    Allergies: Patient has no known allergies.     [facility] - [place], [place] - [address] [address]  [address] [facility] [address]  Phone: [phone] Fax: [phone]    [id number] - [place], [place] - [address] [address]  [address] [address] [address]  Phone: [phone] Fax: [phone]    [facility] [address] [place], [place] - [address]  [address] [facility] [address]  Phone: [phone] Fax: [phone]    [facility] - [place], [place] - [address]  [address]  [place] [id number]  Phone: [phone] Fax: [phone]    Affordability/Accessibility:  Patient has Medicaid prescription insurance. Medicaid DOES NOT cover weight loss medications.  Patient must have Type 2 Diabetes diagnosis for all GLP1 medications except Trulicity.     CURRENT PHARMACOTHERAPY  N/a    DRUG INTERACTIONS  No " "significant drug-drug interactions exist that require adjustment to therapy    LAB  Lab Results   Component Value Date    BILITOT 0.4 07/15/2020    CALCIUM 9.4 07/15/2020    CO2 31 07/15/2020     07/15/2020    CREATININE 0.90 07/15/2020    GLUCOSE 92 07/15/2020    ALKPHOS 62 07/15/2020    K 4.4 07/15/2020    PROT 7.2 07/15/2020     07/15/2020    AST 25 07/15/2020    ALT 65 (H) 07/15/2020    BUN 11 07/15/2020    ANIONGAP 10 07/15/2020    ALBUMIN 4.4 07/15/2020     Lab Results   Component Value Date    TRIG 369 (H) 07/15/2020    CHOL 167 07/15/2020    HDL 26.8 (A) 07/15/2020     No results found for: \"HGBA1C\"    Current Outpatient Medications on File Prior to Visit   Medication Sig Dispense Refill    ibuprofen 800 mg tablet Take 1 tablet (800 mg) by mouth 3 times a day as needed for moderate pain (4 - 6). 90 tablet 3    ketoconazole (NIZOral) 2 % shampoo Apply topically 2 times a week. 120 mL 3    methocarbamol (Robaxin) 500 mg tablet Take 1 tablet (500 mg) by mouth once daily at bedtime. 90 tablet 0    minocycline 100 mg capsule Take 1 capsule (100 mg) by mouth 2 times a day. 60 capsule 0    tadalafil 20 mg tablet Take 1 tablet (20 mg) by mouth once daily as needed for erectile dysfunction. 30 tablet 6    [DISCONTINUED] semaglutide (Ozempic) 0.25 mg or 0.5 mg (2 mg/3 mL) pen injector Inject 0.25 mg under the skin every 7 days. 3 mL 0     No current facility-administered medications on file prior to visit.       PATIENT EDUCATION/GOALS  Answered all patient questions and concerns; provided [facility] phone number if issues/questions arise    RECOMMENDATIONS/PLAN  Assessment/Plan   Problem List Items Addressed This Visit             ICD-10-CM    BMI 45.0-49.9, adult (Multi) - Primary Z68.42     Patient would not like to start a GLP 1 medication due to the side effect profile.  Continue working towards healthy diet and lifestyle.          Follow up with Clinical Pharmacy Team as needed by the patient or PCP. " Provided patient with [facility] phone number as needed.    Continue all meds under the continuation of care with the referring provider and clinical pharmacy team.    Please reach out to the Clinical Pharmacy Team if there are any further questions.     Verbal consent to manage patient's drug therapy was obtained from patient. They were informed they may decline to participate or withdraw from participation in pharmacy services at any time.    Yi Obrien, PharmD  [phone]

## 2024-04-25 ENCOUNTER — APPOINTMENT (OUTPATIENT)
Dept: PRIMARY CARE | Facility: CLINIC | Age: 29
End: 2024-04-25
Payer: COMMERCIAL

## 2024-07-15 ENCOUNTER — APPOINTMENT (OUTPATIENT)
Dept: PRIMARY CARE | Facility: CLINIC | Age: 29
End: 2024-07-15
Payer: COMMERCIAL

## 2024-07-15 DIAGNOSIS — I10 BENIGN HYPERTENSION: ICD-10-CM

## 2024-07-15 DIAGNOSIS — K76.0 FATTY LIVER: ICD-10-CM

## 2024-07-15 DIAGNOSIS — M54.16 LUMBAR RADICULOPATHY: ICD-10-CM

## 2024-07-15 DIAGNOSIS — N32.89 BLADDER SPASM: ICD-10-CM

## 2024-07-15 DIAGNOSIS — M47.26 OSTEOARTHRITIS OF SPINE WITH RADICULOPATHY, LUMBAR REGION: ICD-10-CM

## 2024-07-15 DIAGNOSIS — R53.83 FATIGUE, UNSPECIFIED TYPE: Primary | ICD-10-CM

## 2024-07-15 DIAGNOSIS — E78.5 DYSLIPIDEMIA: ICD-10-CM

## 2024-07-15 PROCEDURE — 99214 OFFICE O/P EST MOD 30 MIN: CPT | Performed by: FAMILY MEDICINE

## 2024-07-15 RX ORDER — MIRABEGRON 25 MG/1
25 TABLET, FILM COATED, EXTENDED RELEASE ORAL DAILY
Qty: 30 TABLET | Refills: 1 | Status: SHIPPED | OUTPATIENT
Start: 2024-07-15

## 2024-07-15 NOTE — PROGRESS NOTES
Subjective   Patient ID: Mamadou Pennington is a 29 y.o. male who presents for Bladder Problem.    Pt c/o voiding incidents.   States he has been having incidents at night where he can not control his bladder. Denies any pain. Recalls 3-4 times a week with an incident.          Review of Systems  12 Systems have been reviewed as follows.  Constitutional: Fever, weight gain, weight loss, appetite change, night sweats, fatigue, chills.  Eyes : blurry, double vision, vision, loss, tearing, redness, pain, sensitivity to light, glaucoma.  Ears, nose, mouth, and throat: Hearing loss, ringing in the ears, ear pain, nasal congestion, nasal drainage, nosebleeds, mouth, throat, irritation tooth problem.  Cardiovascular :chest pain, pressure, heart racing, palpitations, sweating, leg swelling, high or low blood pressure  Pulmonary: Cough, yellow or green sputum, blood and sputum, shortness of breath, wheezing  Gastrointestinal: Nausea, vomiting, diarrhea, constipation, pain, blood in stool, or vomitus, heartburn, difficulty swallowing  Genitourinary: incontinence, abnormal bleeding, abnormal discharge, urinary frequency, urinary hesitancy, pain, impotence sexual problem, infection, urinary retention  Musculoskeletal: Pain, stiffness, joint, redness or warmth, arthritis, back pain, weakness, muscle wasting, sprain or fracture  Neuro: Weight weakness, dizziness, change in voice, change in taste change in vision, change in hearing, loss, or change of sensation, trouble walking, balance problems coordination problems, shaking, speech problem  Endocrine , cold or heat intolerance, blood sugar problem, weight gain or loss missed periods hot flashes, sweats, change in body hair, change in libido, increased thirst, increased urination  Heme/lymph: Swelling, bleeding, problem anemia, bruising, enlarged lymph nodes  Allergic/immunologic: H. plus nasal drip, watery itchy eyes, nasal drainage, immunosuppressed  The above were reviewed and noted  negative except as noted in HPI and Problem List.    Objective   There were no vitals taken for this visit.    Physical Exam  Constitutional: Well developed, well nourished, alert and in no acute distress     Assessment/Plan   Problem List Items Addressed This Visit             ICD-10-CM    Benign hypertension I10    Relevant Orders    Follow Up In [facility] - PCP - Established    Fatigue - Primary R53.83    Relevant Orders    CBC and Auto Differential    Thyroid Stimulating Hormone    Vitamin D 25-Hydroxy,Total (for eval of Vitamin D levels)    Free T4 Index    Follow Up In [facility] - PCP - Established    Osteoarthritis of lumbar spine M47.816    Relevant Orders    Follow Up In [facility] - PCP - Established    BMI 45.0-49.9, adult (Multi) Z68.42    Relevant Orders    Follow Up In [facility] - PCP - Established    Lumbar radiculopathy M54.16    Relevant Orders    Follow Up In [facility] - PCP - Established    Fatty liver K76.0    Relevant Orders    Follow Up In [facility] - PCP - Established     Other Visit Diagnoses         Codes    Dyslipidemia     E78.5    Relevant Orders    Lipid Panel    Comprehensive Metabolic Panel    Follow Up In [facility] - PCP - Established    Bladder spasm     N32.89    Relevant Medications    mirabegron (Myrbetriq) 25 mg tablet extended release 24 hr 24 hr tablet    Other Relevant Orders    US bladder    US renal complete    Follow Up In [facility] - PCP - Established        Virtual Visit - Audio and Visual Communication Real Time     Continue current medications and therapy for chronic medical conditions     Patient told he should not go under general anesthesia for wisdom teeth extraction. Patient should utilize local anesthesia for teeth extraction.      Sample of Ozempic given to patient in past     Pharmacy consult     Consider adipex in future     Auto CPAP supplies ordered.      BW next

## 2024-07-22 ENCOUNTER — HOSPITAL ENCOUNTER (OUTPATIENT)
Dept: RADIOLOGY | Facility: CLINIC | Age: 29
Discharge: HOME | End: 2024-07-22
Payer: COMMERCIAL

## 2024-07-22 DIAGNOSIS — N32.89 BLADDER SPASM: ICD-10-CM

## 2024-07-22 PROCEDURE — 76770 US EXAM ABDO BACK WALL COMP: CPT | Performed by: STUDENT IN AN ORGANIZED HEALTH CARE EDUCATION/TRAINING PROGRAM

## 2024-07-22 PROCEDURE — 76770 US EXAM ABDO BACK WALL COMP: CPT

## 2024-07-27 ENCOUNTER — LAB (OUTPATIENT)
Dept: LAB | Facility: LAB | Age: 29
End: 2024-07-27
Payer: COMMERCIAL

## 2024-07-27 DIAGNOSIS — R53.83 FATIGUE, UNSPECIFIED TYPE: ICD-10-CM

## 2024-07-27 DIAGNOSIS — E78.5 DYSLIPIDEMIA: ICD-10-CM

## 2024-07-27 LAB
25(OH)D3 SERPL-MCNC: 16 NG/ML (ref 30–100)
ALBUMIN SERPL BCP-MCNC: 4.2 G/DL (ref 3.4–5)
ALP SERPL-CCNC: 65 U/L (ref 33–120)
ALT SERPL W P-5'-P-CCNC: 38 U/L (ref 10–52)
ANION GAP SERPL CALC-SCNC: 15 MMOL/L (ref 10–20)
AST SERPL W P-5'-P-CCNC: 22 U/L (ref 9–39)
BASOPHILS # BLD AUTO: 0.05 X10*3/UL (ref 0–0.1)
BASOPHILS NFR BLD AUTO: 0.8 %
BILIRUB SERPL-MCNC: 0.5 MG/DL (ref 0–1.2)
BUN SERPL-MCNC: 8 MG/DL (ref 6–23)
CALCIUM SERPL-MCNC: 9 MG/DL (ref 8.6–10.3)
CHLORIDE SERPL-SCNC: 102 MMOL/L (ref 98–107)
CHOLEST SERPL-MCNC: 181 MG/DL (ref 0–199)
CHOLESTEROL/HDL RATIO: 6.7
CO2 SERPL-SCNC: 27 MMOL/L (ref 21–32)
CREAT SERPL-MCNC: 0.89 MG/DL (ref 0.5–1.3)
EGFRCR SERPLBLD CKD-EPI 2021: >90 ML/MIN/1.73M*2
EOSINOPHIL # BLD AUTO: 0.22 X10*3/UL (ref 0–0.7)
EOSINOPHIL NFR BLD AUTO: 3.6 %
ERYTHROCYTE [DISTWIDTH] IN BLOOD BY AUTOMATED COUNT: 12.5 % (ref 11.5–14.5)
FT4I SERPL CALC-MCNC: 2.1 (ref 1.6–4.7)
GLUCOSE SERPL-MCNC: 79 MG/DL (ref 74–99)
HCT VFR BLD AUTO: 53.8 % (ref 41–52)
HDLC SERPL-MCNC: 27 MG/DL
HGB BLD-MCNC: 17.8 G/DL (ref 13.5–17.5)
IMM GRANULOCYTES # BLD AUTO: 0.02 X10*3/UL (ref 0–0.7)
IMM GRANULOCYTES NFR BLD AUTO: 0.3 % (ref 0–0.9)
LDLC SERPL CALC-MCNC: 93 MG/DL
LYMPHOCYTES # BLD AUTO: 1.96 X10*3/UL (ref 1.2–4.8)
LYMPHOCYTES NFR BLD AUTO: 32.2 %
MCH RBC QN AUTO: 30.5 PG (ref 26–34)
MCHC RBC AUTO-ENTMCNC: 33.1 G/DL (ref 32–36)
MCV RBC AUTO: 92 FL (ref 80–100)
MONOCYTES # BLD AUTO: 0.53 X10*3/UL (ref 0.1–1)
MONOCYTES NFR BLD AUTO: 8.7 %
NEUTROPHILS # BLD AUTO: 3.3 X10*3/UL (ref 1.2–7.7)
NEUTROPHILS NFR BLD AUTO: 54.4 %
NON HDL CHOLESTEROL: 154 MG/DL (ref 0–149)
NRBC BLD-RTO: 0 /100 WBCS (ref 0–0)
PLATELET # BLD AUTO: 295 X10*3/UL (ref 150–450)
POTASSIUM SERPL-SCNC: 4.5 MMOL/L (ref 3.5–5.3)
PROT SERPL-MCNC: 6.6 G/DL (ref 6.4–8.2)
RBC # BLD AUTO: 5.83 X10*6/UL (ref 4.5–5.9)
SODIUM SERPL-SCNC: 139 MMOL/L (ref 136–145)
T3RU NFR SERPL: 37 % (ref 24–41)
T4 SERPL-MCNC: 5.7 UG/DL (ref 4.5–11.1)
TRIGL SERPL-MCNC: 303 MG/DL (ref 0–149)
TSH SERPL-ACNC: 1.11 MIU/L (ref 0.44–3.98)
VLDL: 61 MG/DL (ref 0–40)
WBC # BLD AUTO: 6.1 X10*3/UL (ref 4.4–11.3)

## 2024-07-27 PROCEDURE — 82306 VITAMIN D 25 HYDROXY: CPT

## 2024-07-27 PROCEDURE — 84479 ASSAY OF THYROID (T3 OR T4): CPT

## 2024-07-27 PROCEDURE — 84443 ASSAY THYROID STIM HORMONE: CPT

## 2024-07-27 PROCEDURE — 80053 COMPREHEN METABOLIC PANEL: CPT

## 2024-07-27 PROCEDURE — 84436 ASSAY OF TOTAL THYROXINE: CPT

## 2024-07-27 PROCEDURE — 80061 LIPID PANEL: CPT

## 2024-07-27 PROCEDURE — 85025 COMPLETE CBC W/AUTO DIFF WBC: CPT

## 2024-07-27 PROCEDURE — 36415 COLL VENOUS BLD VENIPUNCTURE: CPT

## 2024-08-28 ENCOUNTER — TELEPHONE (OUTPATIENT)
Dept: PAIN MANAGEMENT | Age: 29
End: 2024-08-28

## 2024-08-28 NOTE — TELEPHONE ENCOUNTER
PATIENT CALLED IN TO SCHEDULE WITH DR CUI.  NO OPENINGS WITH DR CUI TILL 9/3 IN [facility].  LEFT MESSAGE FOR PATIENT THAT APPOINTMENT WILL NOT BE TOMORROW.  [employer] MESSAGE WAS ALSO SENT.

## 2025-05-29 ENCOUNTER — OFFICE VISIT (OUTPATIENT)
Dept: PRIMARY CARE | Facility: CLINIC | Age: 30
End: 2025-05-29
Payer: COMMERCIAL

## 2025-05-29 VITALS
WEIGHT: 315 LBS | BODY MASS INDEX: 45.97 KG/M2 | OXYGEN SATURATION: 97 % | HEART RATE: 95 BPM | DIASTOLIC BLOOD PRESSURE: 96 MMHG | SYSTOLIC BLOOD PRESSURE: 136 MMHG

## 2025-05-29 DIAGNOSIS — Z78.9 HEALTH MAINTENANCE ALTERATION: ICD-10-CM

## 2025-05-29 DIAGNOSIS — M47.816 OSTEOARTHRITIS OF LUMBAR SPINE, UNSPECIFIED SPINAL OSTEOARTHRITIS COMPLICATION STATUS: ICD-10-CM

## 2025-05-29 DIAGNOSIS — L65.9 ALOPECIA: Primary | ICD-10-CM

## 2025-05-29 DIAGNOSIS — N52.9 ERECTILE DYSFUNCTION, UNSPECIFIED ERECTILE DYSFUNCTION TYPE: ICD-10-CM

## 2025-05-29 DIAGNOSIS — L70.9 ACNE, UNSPECIFIED ACNE TYPE: ICD-10-CM

## 2025-05-29 DIAGNOSIS — Z91.89 AT RISK FOR HYPERGLYCEMIA: ICD-10-CM

## 2025-05-29 PROCEDURE — 99214 OFFICE O/P EST MOD 30 MIN: CPT | Performed by: FAMILY MEDICINE

## 2025-05-29 RX ORDER — PREDNISONE 10 MG/1
TABLET ORAL
Qty: 30 TABLET | Refills: 0 | Status: SHIPPED | OUTPATIENT
Start: 2025-05-29

## 2025-05-29 RX ORDER — MINOCYCLINE HYDROCHLORIDE 100 MG/1
100 CAPSULE ORAL 2 TIMES DAILY
Qty: 60 CAPSULE | Refills: 0 | Status: CANCELLED | OUTPATIENT
Start: 2025-05-29

## 2025-05-29 RX ORDER — IBUPROFEN 800 MG/1
800 TABLET, FILM COATED ORAL 3 TIMES DAILY PRN
Qty: 90 TABLET | Refills: 2 | Status: SHIPPED | OUTPATIENT
Start: 2025-05-29

## 2025-05-29 RX ORDER — METHOCARBAMOL 500 MG/1
500 TABLET, FILM COATED ORAL NIGHTLY
Qty: 90 TABLET | Refills: 2 | Status: SHIPPED | OUTPATIENT
Start: 2025-05-29

## 2025-05-29 RX ORDER — TADALAFIL 20 MG/1
20 TABLET ORAL DAILY PRN
Qty: 30 TABLET | Refills: 6 | Status: SHIPPED | OUTPATIENT
Start: 2025-05-29

## 2025-05-29 RX ORDER — FINASTERIDE 1 MG/1
1 TABLET, FILM COATED ORAL DAILY
Qty: 30 TABLET | Refills: 11 | Status: SHIPPED | OUTPATIENT
Start: 2025-05-29 | End: 2026-05-29

## 2025-05-29 ASSESSMENT — ENCOUNTER SYMPTOMS
BACK PAIN: 1
PARESIS: 0
FEVER: 0
WEIGHT LOSS: 0
TINGLING: 1
ABDOMINAL PAIN: 0
DYSURIA: 0
BOWEL INCONTINENCE: 0
WEAKNESS: 1
PERIANAL NUMBNESS: 0
HEADACHES: 0
LEG PAIN: 1
NUMBNESS: 1
PARESTHESIAS: 1

## 2025-05-29 NOTE — PROGRESS NOTES
"Subjective   Patient ID: Justin Pennington \"Stu" is a 29 y.o. male who presents for Back Pain (Reoccurring radiating down his kegs. // Pt reports thinning of hair, discuss treatment ).  History of Present Illness  The patient presents for evaluation of back pain, hair loss, and medication refills.    He reports experiencing severe pain from a herniated disc in his lower back, which he believes may be exacerbated by certain activities. He is seeking a refill of his prednisone prescription, which he has found to be effective in managing the pain.    He is currently employed and has insurance coverage through his employer. However, he anticipates a gap in coverage until 07/01/2025. He is requesting refills for his medications, including tadalafil, which he occasionally uses as needed. He is not currently taking minocycline. He has been prescribed Robaxin 90 tablets with several refills, which he uses sparingly and expects to last for an extended period. He typically uses methocarbamol during flare-ups.    He has been experiencing significant hair thinning and has attempted treatment with minoxidil drops, which have proven ineffective. He is interested in exploring oral medication options to potentially stimulate hair regrowth.  See Above  Review of Systems  12 Systems have been reviewed as follows.  Constitutional: Fever, weight gain, weight loss, appetite change, night sweats, fatigue, chills.  Eyes : blurry, double vision, vision, loss, tearing, redness, pain, sensitivity to light, glaucoma.  Ears, nose, mouth, and throat: Hearing loss, ringing in the ears, ear pain, nasal congestion, nasal drainage, nosebleeds, mouth, throat, irritation tooth problem.  Cardiovascular :chest pain, pressure, heart racing, palpitations, sweating, leg swelling, high or low blood pressure  Pulmonary: Cough, yellow or green sputum, blood and sputum, shortness of breath, wheezing  Gastrointestinal: Nausea, vomiting, diarrhea, constipation, " pain, blood in stool, or vomitus, heartburn, difficulty swallowing  Genitourinary: incontinence, abnormal bleeding, abnormal discharge, urinary frequency, urinary hesitancy, pain, impotence sexual problem, infection, urinary retention  Musculoskeletal: Pain, stiffness, joint, redness or warmth, arthritis, back pain, weakness, muscle wasting, sprain or fracture  Neuro: Weight weakness, dizziness, change in voice, change in taste change in vision, change in hearing, loss, or change of sensation, trouble walking, balance problems coordination problems, shaking, speech problem  Endocrine , cold or heat intolerance, blood sugar problem, weight gain or loss missed periods hot flashes, sweats, change in body hair, change in libido, increased thirst, increased urination  Heme/lymph: Swelling, bleeding, problem anemia, bruising, enlarged lymph nodes  Allergic/immunologic: H. plus nasal drip, watery itchy eyes, nasal drainage, immunosuppressed  The above were reviewed and noted negative except as noted in HPI and Problem List.    Objective     BP (!) 136/96   Pulse 95   Wt (!) 158 kg (348 lb 6.4 oz)   SpO2 97%   BMI 45.97 kg/m²      Physical Exam    Constitutional: Well developed, well nourished, alert and in no acute distress   Eyes: Normal external exam. Pupils equally round and reactive to light with normal accommodation and extraocular movements intact.  Neck: Supple, no lymphadenopathy or masses.   Cardiovascular: Regular rate and rhythm, normal S1 and S2, no murmurs, gallops, or rubs. Radial pulses normal. No peripheral edema.  Pulmonary: No respiratory distress, lungs clear to auscultation bilaterally. No wheezes, rhonchi, rales.  Abdomen: soft,non tender, non distended, without masses or HSM  Skin: Warm, well perfused, normal skin turgor and color.   Neurologic: Cranial nerves II-XII grossly intact.   Psychiatric: Mood calm and affect normal  Musculoskeletal: Moving all extremities without restriction  The above  were reviewed and noted negative except as noted in HPI and Problem List.      Results           Assessment & Plan  1. Back pain.  - Experiences flare-ups of back pain due to a herniated disk.  - A prescription for prednisone will be provided to manage these episodes.  - Advised to use the medication as needed during flare-ups and to request a refill if symptoms persist for an extended period.  - A virtual visit may be scheduled if necessary.    2. Hair loss.  - Reports significant thinning of hair and has previously tried minoxidil drops without success.  - A prescription for Propecia 1 mg daily will be provided.  - Informed that Propecia may make it harder to be sexually active but at 1 mg it should not be significant.  - Advised to continue using Rogaine twice daily topically if desired.    3. Medication management.  - Currently taking tadalafil and requires a refill.  - A prescription for tadalafil 30 tablets with 6 refills will be sent to pharmacy.  - Prescriptions for Robaxin 90 tablets with a couple of refills and ibuprofen 90 tablets with a couple of refills will be provided.    4. Health maintenance.  - Has not had blood work done in a while and requests a comprehensive check-up.  - Blood work including CBC, CMP, lipid panel, vitamin D, thyroid function tests, and A1c will be ordered.  - Advised to fast after 11 PM and can have water and black coffee in the morning before the test.    Follow-up  The patient will follow up in 2 months.    Problem List Items Addressed This Visit       Alopecia - Primary    Relevant Medications    finasteride (Propecia) 1 mg tablet    Other Relevant Orders    Follow Up In [facility] - PCP - Established    Erectile dysfunction    Relevant Medications    tadalafil 20 mg tablet    Other Relevant Orders    Follow Up In [facility] - PCP - Established    Osteoarthritis of lumbar spine    Relevant Medications    methocarbamol (Robaxin) 500 mg tablet    ibuprofen  800 mg tablet    predniSONE (Deltasone) 10 mg tablet    Other Relevant Orders    Follow Up In [facility] - PCP - Established    Acne    Relevant Orders    Follow Up In [facility] - PCP - Established     Other Visit Diagnoses         Health maintenance alteration        Relevant Orders    Comprehensive Metabolic Panel    CBC and Auto Differential    Lipid Panel    Vitamin D 25-Hydroxy,Total (for eval of Vitamin D levels)    Thyroid Stimulating Hormone    Hemoglobin A1c    Follow Up In [facility] - PCP - Established      At risk for hyperglycemia        Relevant Orders    Hemoglobin A1c    Follow Up In [facility] - PCP - Established           Continue current medications and therapy for chronic medical conditions    Consider adipex in future     Auto CPAP supplies ordered.     Rob Sharma MD       This medical note was created with the assistance of artificial intelligence (AI) for documentation purposes. The content has been reviewed and confirmed by the healthcare provider for accuracy and completeness. Patient consented to the use of audio recording and use of AI during their visit.   Answers submitted by the patient for this visit:  Back Pain Questionnaire (Submitted on 5/29/2025)  Chief Complaint: Back pain  Chronicity: chronic  Onset: more than 1 year ago  Frequency: 2 to 4 times per day  Progression since onset: waxing and waning  Pain location: gluteal, lumbar spine, sacro-iliac  Pain quality: aching, stabbing  Radiates to: left thigh, right thigh  Pain - numeric: 7/10  Pain is: the same all the time  Aggravated by: bending, standing, stress, twisting  Stiffness is present: all day  bladder incontinence: No  bowel incontinence: No  leg pain: Yes  perianal numbness: No  paresis: No  pelvic pain: No  paresthesias: Yes  tingling: Yes  weight loss: No  Risk factors: obesity

## 2025-07-02 ENCOUNTER — OFFICE VISIT (OUTPATIENT)
Dept: URGENT CARE | Age: 30
End: 2025-07-02
Payer: COMMERCIAL

## 2025-07-02 VITALS
HEIGHT: 73 IN | BODY MASS INDEX: 41.75 KG/M2 | WEIGHT: 315 LBS | TEMPERATURE: 98 F | DIASTOLIC BLOOD PRESSURE: 97 MMHG | SYSTOLIC BLOOD PRESSURE: 139 MMHG | HEART RATE: 75 BPM | OXYGEN SATURATION: 97 %

## 2025-07-02 DIAGNOSIS — H92.02 LEFT EAR PAIN: Primary | ICD-10-CM

## 2025-07-02 DIAGNOSIS — H61.23 BILATERAL IMPACTED CERUMEN: ICD-10-CM

## 2025-07-02 RX ORDER — AMOXICILLIN AND CLAVULANATE POTASSIUM 875; 125 MG/1; MG/1
875 TABLET, FILM COATED ORAL 2 TIMES DAILY
Qty: 20 TABLET | Refills: 0 | Status: SHIPPED | OUTPATIENT
Start: 2025-07-02

## 2025-07-02 ASSESSMENT — ENCOUNTER SYMPTOMS
PSYCHIATRIC NEGATIVE: 1
MUSCULOSKELETAL NEGATIVE: 1
NEUROLOGICAL NEGATIVE: 1
EYES NEGATIVE: 1
PALPITATIONS: 0
GASTROINTESTINAL NEGATIVE: 1
ENDOCRINE NEGATIVE: 1
HEMATOLOGIC/LYMPHATIC NEGATIVE: 1
CONSTITUTIONAL NEGATIVE: 1
RESPIRATORY NEGATIVE: 1
ALLERGIC/IMMUNOLOGIC NEGATIVE: 1

## 2025-07-02 NOTE — PROGRESS NOTES
"Subjective   Patient ID: Justin Pennington \"Stu" is a 30 y.o. male. They present today with a chief complaint of Earache (Lots of pain, tried using qtips & peroxide to clean ears then wax went further back. Using tylenol and ibu for pain. Sharp pain. LEFT ).    History of Present Illness  HPI    Pt presents to urgent care with c/o Left ear pain.  Patient reports he has been trying to use over-the-counter drops and Q-tips to dislodge wax from his ear for the past few days with no results.  Reports he is now having left ear pain and thinks he pushed wax too far into his ear canal.  Reports hearing is muffled.  Denies drainage from ears  .  Pt denies CP, SOB, palpitations, fevers, abd pain, n/v/d, sick contacts, recent travel.        Past Medical History  Allergies as of 07/02/2025    (No Known Allergies)       Prescriptions Prior to Admission[1]     Medical History[2]    Surgical History[3]     reports that he has quit smoking. His smoking use included cigarettes. He has been exposed to tobacco smoke. He has never used smokeless tobacco.    Review of Systems  Review of Systems   Constitutional: Negative.    HENT:  Positive for ear pain.    Eyes: Negative.    Respiratory: Negative.     Cardiovascular:  Negative for chest pain and palpitations.   Gastrointestinal: Negative.    Endocrine: Negative.    Genitourinary: Negative.    Musculoskeletal: Negative.    Skin: Negative.    Allergic/Immunologic: Negative.    Neurological: Negative.    Hematological: Negative.    Psychiatric/Behavioral: Negative.     All other systems reviewed and are negative.                                 Objective    Vitals:    07/02/25 1309   BP: (!) 139/97   Pulse: 75   Temp: 36.7 °C (98 °F)   SpO2: 97%   Weight: (!) 154 kg (340 lb)   Height: 1.854 m (6' 1\")     No LMP for male patient.    Physical Exam  Vitals and nursing note reviewed.   Constitutional:       General: He is not in acute distress.     Appearance: Normal appearance. He is obese. " He is not ill-appearing or toxic-appearing.   HENT:      Head: Atraumatic.      Right Ear: There is impacted cerumen.      Left Ear: There is impacted cerumen.      Mouth/Throat:      Mouth: Mucous membranes are moist.      Pharynx: Oropharynx is clear.   Eyes:      Extraocular Movements: Extraocular movements intact.      Conjunctiva/sclera: Conjunctivae normal.      Pupils: Pupils are equal, round, and reactive to light.   Cardiovascular:      Rate and Rhythm: Normal rate.   Pulmonary:      Effort: Pulmonary effort is normal.   Skin:     General: Skin is warm and dry.   Neurological:      General: No focal deficit present.      Mental Status: He is alert and oriented to person, place, and time.   Psychiatric:         Mood and Affect: Mood normal.         Behavior: Behavior normal.         Thought Content: Thought content normal.         Procedures    Point of Care Test & Imaging Results from this visit  No results found for this visit on 07/02/25.   Imaging  No results found.    Cardiology, Vascular, and Other Imaging  No other imaging results found for the past 2 days      Diagnostic study results (if any) were reviewed by JAYDEN Contreras.    Assessment/Plan   Allergies, medications, history, and pertinent labs/EKGs/Imaging reviewed by JAYDEN Contreras.     Medical Decision Making   Patient presents with bilateral cerumen impaction.  Initially patient declined having ears irrigated due to pain in left ear.  He later agreed to having left ear only irrigated.  Moderate amount of wax removed by clinical staff with irrigation.  TM does appear red, erythematous  consistent with otitis media.  Will DC home with prescription Augmentin.At time of discharge patient was clinically well-appearing and HDS for outpatient management. The patient was educated regarding diagnosis, supportive care, OTC and Rx medications. The patient was given the opportunity to ask questions prior to discharge.  They  verbalized understanding of my discussion of the plans for treatment, expected course, indications to return to  or seek further evaluation in ED, and the need for timely follow up as directed.   They were provided with a work/school excuse if requested.       Orders and Diagnoses  Diagnoses and all orders for this visit:  Left ear pain  -     amoxicillin-clavulanate (Augmentin) 875-125 mg tablet; Take 1 tablet (875 mg of amoxicillin) by mouth 2 times a day.  Bilateral impacted cerumen  -     Ear Cerumen Removal; Future      Medical Admin Record      Patient disposition: Home    Electronically signed by JAYDEN Contreras  2:48 PM             [1] (Not in a hospital admission)   [2] No past medical history on file.  [3]   Past Surgical History:  Procedure Laterality Date    OTHER SURGICAL HISTORY  07/15/2020    No history of surgery

## 2025-07-08 ENCOUNTER — OFFICE VISIT (OUTPATIENT)
Dept: OTOLARYNGOLOGY | Facility: CLINIC | Age: 30
End: 2025-07-08
Payer: COMMERCIAL

## 2025-07-08 DIAGNOSIS — H92.02 LEFT EAR PAIN: ICD-10-CM

## 2025-07-08 DIAGNOSIS — H60.542 ACUTE ECZEMATOID OTITIS EXTERNA OF LEFT EAR: Primary | ICD-10-CM

## 2025-07-08 DIAGNOSIS — H61.23 BILATERAL IMPACTED CERUMEN: ICD-10-CM

## 2025-07-08 PROCEDURE — 1036F TOBACCO NON-USER: CPT | Performed by: OTOLARYNGOLOGY

## 2025-07-08 PROCEDURE — 69210 REMOVE IMPACTED EAR WAX UNI: CPT | Performed by: OTOLARYNGOLOGY

## 2025-07-08 PROCEDURE — 99203 OFFICE O/P NEW LOW 30 MIN: CPT | Performed by: OTOLARYNGOLOGY

## 2025-07-08 RX ORDER — OFLOXACIN 3 MG/ML
SOLUTION AURICULAR (OTIC)
Qty: 10 ML | Refills: 1 | Status: SHIPPED | OUTPATIENT
Start: 2025-07-08

## 2025-07-08 ASSESSMENT — ENCOUNTER SYMPTOMS
RHINORRHEA: 0
VOMITING: 0
HEADACHES: 0
SORE THROAT: 0
DIARRHEA: 0
COUGH: 0
ABDOMINAL PAIN: 0
NECK PAIN: 0

## 2025-07-08 NOTE — PROGRESS NOTES
"  Patient ID: Justin Pennington \"Stu" is a 30 y.o. male who presents for No chief complaint on file..      HPI   This 30-year-old is being seen in the office today for an evaluation of ear pain.  He was noted to have impaction of cerumen when seen in urgent care on 2 July.  Impaction was noted bilaterally.   He did have his left ear irrigated and a moderate amount of cerumen was able to be removed although the tympanic membrane appeared to be erythematous.  He was placed on Augmentin at that time and discharged.     Answers submitted by the patient for this visit:  Ear Pain Questionnaire (Submitted on 7/8/2025)  Chief Complaint: Ear pain  Affected ear: left  Chronicity: new  Onset: in the past 7 days  Progression since onset: unchanged  Frequency: every few hours  Fever: no fever  Pain - numeric: 8/10  abdominal pain: No  cough: No  diarrhea: No  ear discharge: No  headaches: No  hearing loss: Yes  neck pain: No  rash: No  rhinorrhea: No  sore throat: No  vomiting: No    ROS   A 12 point ROS  has been reviewed and are negative for complaint except for what is stated in the history of present illness and /or for past medical history as noted in the EMR.     Medical History[1]    Current Medications[2]    Surgical History[3]    Family History[4]    Social History[5]    RX Allergies[6]     vitals were not taken for this visit.       PHYSICAL EXAM  Examination:    CONSTITUTIONAL: Alert, in no acute distress, normal pitch/clarity of voice, well-developed, well-nourished, cooperative.  HEAD/FACE: Normocephalic, atraumatic, no tenderness over the sinuses, facial strength and movement symmetric.    SKIN: Good turgor, no rashes, no suspicious lesions, in the head and neck.    EYES: Both eyes have normal extraocular movements with no nystagmus, pupils are equal and reactive to light and accommodation, conjunctiva is clear.    EARS: Microscopic evaluation of both ears was done.  There was skin inflammation wax impaction noted " "especially on the left with also impaction noted on the right.  See procedure note    NOSE: No external skin lesions are noted, nares are patent, septum is intact with deviation to the left and noninflamed, nasal turbinates are prominent in appearance, sinuses are nontender to palpation bilaterally, no internal lesions or polyps are noted, no discharge is noted.    OROPHARYNX/ORAL CAVITY: Mucous membranes of the oropharynx and the oral cavity proper are without lesions or ulcerations, tongue mobility is normal and no lesions are noted, gingiva and alveolar mucosa is intact without lesions, oral mucosa is moist, muscular movement of the palate and gag reflex are normal.    NECK: Short thick neck, no lymphadenopathy is palpated, neck is supple with full range of motion, thyroid is without swelling or tenderness, trachea is midline, no neck masses are noted.    Lymphatics: No cervical adenopathy or supraclavicular adenopathy noted to palpation.    HEART/VASCULAR: No jugular venous distention is noted, carotid pulsations are intact with a regular rate and rhythm noted,    PULMONARY: Good air movement with normal inspiratory/expiratory effort is noted, no audible wheezing is appreciated.    NEUROLOGIC: Alert and oriented, cranial nerves are grossly intact, gait is normal, sensation in the head and neck is intact,    PSYCH: oriented to person, place and time, normal mood and affect.    EXTREMITIES: No motor dysfunction of the upper and lower extremity is noted.  Patient ID: Justin Pennington \"Stu" is a 30 y.o. male.    Binocular microscopy    Date/Time: 7/8/2025 2:39 PM    Performed by: Chandler Evans DMD, MD  Authorized by: Chandler Evans DMD, MD    Consent:     Consent obtained:  Verbal    Consent given by:  Patient    Risks discussed:  Pain    Alternatives discussed:  No treatment and observation  Procedure details:     Indications: examination of tympanic membrane, debridement and ear cerumen removal  "   Post-procedure details:     Patient tolerance of procedure:  Tolerated well, no immediate complications  Comments:      Microscopic evaluation of the left ear was done first.  There was scant inflammation noted in impaction of the cerumen and epithelial debris down against the tympanic membrane.  This was able to be removed with parasite suctions.  The tympanic membrane itself was intact with no perforations.  There is no active otorrhea noted.  No mastoid tenderness was felt to palpation.  On the right-hand side there was cerumen impaction forced down from Q-tip use.  This was removed with wax loop and very slight suctions.  The tympanic membrane was intact.    ASSESSMENT/PLAN  Problem List Items Addressed This Visit    None  Visit Diagnoses         Codes      Acute eczematoid otitis externa of left ear    -  Primary H60.542    Relevant Medications    ofloxacin (Floxin) 0.3 % otic solution      Bilateral impacted cerumen     H61.23      Left ear pain     H92.02             I discussed the clinical finds the patient.  His left ear does have inflammation of the skin of the ear canal along with entrapment of material which was removed with suctioning.  The tympanic membrane was intact and there was no signs of middle ear infection.  He is to be placed on Floxin drops twice a day for 1 week to the left ear.  Once there is clearance of the infection for dryness of the skin he can use a drop of baby oil or sweet oil in the ear canals at least 1 day weekly morning and night on that day.  Keeping water out of the ears and avoid Q-tip use would also be advised.  If there is pain or discharge from the ears he needs follow-up.  Since he is traveling from a distance he should try to make an appointment with necessary with a local ENT provider through .            [1] History reviewed. No pertinent past medical history.  [2]   Current Outpatient Medications:     amoxicillin-clavulanate (Augmentin) 875-125 mg tablet, Take 1  tablet (875 mg of amoxicillin) by mouth 2 times a day., Disp: 20 tablet, Rfl: 0    ibuprofen 800 mg tablet, Take 1 tablet (800 mg) by mouth 3 times a day as needed for moderate pain (4 - 6)., Disp: 90 tablet, Rfl: 2    methocarbamol (Robaxin) 500 mg tablet, Take 1 tablet (500 mg) by mouth once daily at bedtime., Disp: 90 tablet, Rfl: 2    tadalafil 20 mg tablet, Take 1 tablet (20 mg) by mouth once daily as needed for erectile dysfunction., Disp: 30 tablet, Rfl: 6    finasteride (Propecia) 1 mg tablet, Take 1 tablet (1 mg) by mouth once daily. Do not crush, chew, or split., Disp: 30 tablet, Rfl: 11    ketoconazole (NIZOral) 2 % shampoo, Apply topically 2 times a week., Disp: 120 mL, Rfl: 3    mirabegron (Myrbetriq) 25 mg tablet extended release 24 hr 24 hr tablet, Take 1 tablet (25 mg) by mouth once daily. (Patient not taking: Reported on 7/2/2025), Disp: 30 tablet, Rfl: 1    predniSONE (Deltasone) 10 mg tablet, 4x 3days,3 x 3 days,2 x 3 days,1 x 3 days (Patient not taking: Reported on 7/8/2025), Disp: 30 tablet, Rfl: 0  [3]   Past Surgical History:  Procedure Laterality Date    OTHER SURGICAL HISTORY  07/15/2020    No history of surgery   [4]   Family History  Problem Relation Name Age of Onset    Cancer Maternal Grandmother Ronna Ramirez     Hypertension Maternal Grandmother Ronna Ramirez     Cancer Maternal Grandmother Ronna Ramirez     Hypertension Maternal Grandmother Ronna Ramirez    [5]   Social History  Tobacco Use    Smoking status: Former     Current packs/day: 1.00     Average packs/day: 1 pack/day for 3.0 years (3.0 ttl pk-yrs)     Types: Cigarettes     Passive exposure: Past    Smokeless tobacco: Never   Substance Use Topics    Alcohol use: Not Currently     Alcohol/week: 1.0 standard drink of alcohol     Types: 1 Cans of beer per week    Drug use: Never   [6] No Known Allergies

## 2025-07-29 ENCOUNTER — APPOINTMENT (OUTPATIENT)
Dept: PRIMARY CARE | Facility: CLINIC | Age: 30
End: 2025-07-29
Payer: COMMERCIAL

## 2025-08-14 ENCOUNTER — APPOINTMENT (OUTPATIENT)
Dept: OTOLARYNGOLOGY | Facility: CLINIC | Age: 30
End: 2025-08-14
Payer: COMMERCIAL